# Patient Record
Sex: MALE | Race: WHITE | Employment: FULL TIME | URBAN - METROPOLITAN AREA
[De-identification: names, ages, dates, MRNs, and addresses within clinical notes are randomized per-mention and may not be internally consistent; named-entity substitution may affect disease eponyms.]

---

## 2018-08-01 ENCOUNTER — HOSPITAL ENCOUNTER (EMERGENCY)
Age: 28
Discharge: HOME | End: 2018-08-01
Attending: EMERGENCY MEDICINE
Payer: COMMERCIAL

## 2018-08-01 ENCOUNTER — APPOINTMENT (OUTPATIENT)
Dept: CT IMAGING | Age: 28
End: 2018-08-01
Payer: COMMERCIAL

## 2018-08-01 VITALS
RESPIRATION RATE: 16 BRPM | TEMPERATURE: 98.3 F | OXYGEN SATURATION: 97 % | SYSTOLIC BLOOD PRESSURE: 140 MMHG | DIASTOLIC BLOOD PRESSURE: 87 MMHG | HEIGHT: 69 IN | BODY MASS INDEX: 46.65 KG/M2 | HEART RATE: 77 BPM | WEIGHT: 315 LBS

## 2018-08-01 DIAGNOSIS — R51.9 NONINTRACTABLE HEADACHE, UNSPECIFIED CHRONICITY PATTERN, UNSPECIFIED HEADACHE TYPE: Primary | ICD-10-CM

## 2018-08-01 LAB
CHP ED QC CHECK: NORMAL
CHP ED QC CHECK: NORMAL
GLUCOSE BLD-MCNC: 101 MG/DL
METER GLUCOSE: 101 MG/DL (ref 70–110)
PREGNANCY TEST URINE, POC: NEGATIVE

## 2018-08-01 PROCEDURE — 82962 GLUCOSE BLOOD TEST: CPT

## 2018-08-01 PROCEDURE — 70450 CT HEAD/BRAIN W/O DYE: CPT

## 2018-08-01 PROCEDURE — 99284 EMERGENCY DEPT VISIT MOD MDM: CPT

## 2018-08-01 ASSESSMENT — PAIN DESCRIPTION - DESCRIPTORS: DESCRIPTORS: PRESSURE

## 2018-08-01 ASSESSMENT — ENCOUNTER SYMPTOMS
BACK PAIN: 0
VOMITING: 0
EYE PAIN: 0
SINUS PRESSURE: 0
ABDOMINAL DISTENTION: 0
WHEEZING: 0
SHORTNESS OF BREATH: 0
SORE THROAT: 0
DIARRHEA: 0
EYE REDNESS: 0
COUGH: 0
EYE DISCHARGE: 0
NAUSEA: 0

## 2018-08-01 ASSESSMENT — PAIN DESCRIPTION - PAIN TYPE: TYPE: ACUTE PAIN

## 2018-08-01 ASSESSMENT — PAIN DESCRIPTION - LOCATION: LOCATION: HEAD;EYE

## 2018-08-01 ASSESSMENT — VISUAL ACUITY
OS: 20/25
OD: 20/25

## 2018-08-01 ASSESSMENT — PAIN DESCRIPTION - FREQUENCY: FREQUENCY: CONTINUOUS

## 2018-08-01 ASSESSMENT — PAIN SCALES - GENERAL: PAINLEVEL_OUTOF10: 6

## 2018-08-01 NOTE — ED PROVIDER NOTES
------------------------- NURSING NOTES AND VITALS REVIEWED ---------------------------  Date / Time Roomed:  8/1/2018  5:24 PM  ED Bed Assignment:  29/29    The nursing notes within the ED encounter and vital signs as below have been reviewed. BP (!) 157/82   Pulse 90   Temp 98.3 °F (36.8 °C)   Resp 16   Ht 5' 9\" (1.753 m)   Wt (!) 330 lb (149.7 kg)   SpO2 96%   BMI 48.73 kg/m²   Oxygen Saturation Interpretation: Normal      ------------------------------------------ PROGRESS NOTES ------------------------------------------  8:21 PM  I have spoken with the patient and discussed todays results, in addition to providing specific details for the plan of care and counseling regarding the diagnosis and prognosis. Their questions are answered at this time and they are agreeable with the plan. I discussed at length with them reasons for immediate return here for re evaluation. They will followup with their primary care physician by calling their office tomorrow. --------------------------------- ADDITIONAL PROVIDER NOTES ---------------------------------  At this time the patient is without objective evidence of an acute process requiring hospitalization or inpatient management. They have remained hemodynamically stable throughout their entire ED visit and are stable for discharge with outpatient follow-up. The plan has been discussed in detail and they are aware of the specific conditions for emergent return, as well as the importance of follow-up. New Prescriptions    No medications on file       Diagnosis:  1. Nonintractable headache, unspecified chronicity pattern, unspecified headache type        Disposition:  Patient's disposition: Discharge to home  Patient's condition is stable.        Vi Lopez,   Resident  08/01/18 [address],   Resident  08/01/18 [id number]

## 2018-08-02 NOTE — ED NOTES
Discharge instructions given, medications and follow up instructions reviewed. Patient verbalized understanding, no other noted or stated problems at this time. Patient will follow up with physicians as directed.         Nahomy Mckeon RN  08/01/18 [id number]

## 2018-08-03 ENCOUNTER — HOSPITAL ENCOUNTER (OUTPATIENT)
Age: 28
Discharge: HOME | End: 2018-08-03
Payer: COMMERCIAL

## 2018-08-03 ENCOUNTER — OFFICE VISIT (OUTPATIENT)
Dept: NEUROLOGY | Age: 28
End: 2018-08-03
Payer: COMMERCIAL

## 2018-08-03 VITALS
RESPIRATION RATE: 16 BRPM | BODY MASS INDEX: 46.65 KG/M2 | HEIGHT: 69 IN | SYSTOLIC BLOOD PRESSURE: 132 MMHG | HEART RATE: 84 BPM | TEMPERATURE: 98.3 F | DIASTOLIC BLOOD PRESSURE: 87 MMHG | WEIGHT: 315 LBS

## 2018-08-03 DIAGNOSIS — G89.29 CHRONIC NONINTRACTABLE HEADACHE, UNSPECIFIED HEADACHE TYPE: Primary | ICD-10-CM

## 2018-08-03 DIAGNOSIS — H93.A3 PULSATILE TINNITUS OF BOTH EARS: ICD-10-CM

## 2018-08-03 DIAGNOSIS — R51.9 CHRONIC NONINTRACTABLE HEADACHE, UNSPECIFIED HEADACHE TYPE: ICD-10-CM

## 2018-08-03 DIAGNOSIS — R51.9 CHRONIC NONINTRACTABLE HEADACHE, UNSPECIFIED HEADACHE TYPE: Primary | ICD-10-CM

## 2018-08-03 DIAGNOSIS — G89.29 CHRONIC NONINTRACTABLE HEADACHE, UNSPECIFIED HEADACHE TYPE: ICD-10-CM

## 2018-08-03 LAB
ALBUMIN SERPL-MCNC: 4.3 G/DL (ref 3.5–5.2)
ALP BLD-CCNC: 105 U/L (ref 35–104)
ALT SERPL-CCNC: 39 U/L (ref 0–32)
ANION GAP SERPL CALCULATED.3IONS-SCNC: 14 MMOL/L (ref 7–16)
APTT: 33.5 SEC (ref 24.5–35.1)
AST SERPL-CCNC: 45 U/L (ref 0–31)
BILIRUB SERPL-MCNC: 0.4 MG/DL (ref 0–1.2)
BUN BLDV-MCNC: 15 MG/DL (ref 6–20)
C-REACTIVE PROTEIN: 1.8 MG/DL (ref 0–0.4)
CALCIUM SERPL-MCNC: 9.5 MG/DL (ref 8.6–10.2)
CHLORIDE BLD-SCNC: 100 MMOL/L (ref 98–107)
CO2: 27 MMOL/L (ref 22–29)
CREAT SERPL-MCNC: 0.8 MG/DL (ref 0.5–1)
GFR AFRICAN AMERICAN: >60
GFR NON-AFRICAN AMERICAN: >60 ML/MIN/1.73
GLUCOSE BLD-MCNC: 89 MG/DL (ref 74–109)
HCT VFR BLD CALC: 47.4 % (ref 34–48)
HEMOGLOBIN: 15.9 G/DL (ref 11.5–15.5)
INR BLD: 1.1
MCH RBC QN AUTO: 28.3 PG (ref 26–35)
MCHC RBC AUTO-ENTMCNC: 33.5 % (ref 32–34.5)
MCV RBC AUTO: 84.5 FL (ref 80–99.9)
PDW BLD-RTO: 12.2 FL (ref 11.5–15)
PLATELET # BLD: 272 E9/L (ref 130–450)
PMV BLD AUTO: 11.6 FL (ref 7–12)
POTASSIUM SERPL-SCNC: 4.3 MMOL/L (ref 3.5–5)
PROTHROMBIN TIME: 12.4 SEC (ref 9.3–12.4)
RBC # BLD: 5.61 E12/L (ref 3.5–5.5)
SEDIMENTATION RATE, ERYTHROCYTE: 11 MM/HR (ref 0–20)
SODIUM BLD-SCNC: 141 MMOL/L (ref 132–146)
TOTAL PROTEIN: 7.7 G/DL (ref 6.4–8.3)
TSH SERPL DL<=0.05 MIU/L-ACNC: 2.49 UIU/ML (ref 0.27–4.2)
WBC # BLD: 10.2 E9/L (ref 4.5–11.5)

## 2018-08-03 PROCEDURE — 80053 COMPREHEN METABOLIC PANEL: CPT

## 2018-08-03 PROCEDURE — 86140 C-REACTIVE PROTEIN: CPT

## 2018-08-03 PROCEDURE — 84443 ASSAY THYROID STIM HORMONE: CPT

## 2018-08-03 PROCEDURE — 85730 THROMBOPLASTIN TIME PARTIAL: CPT

## 2018-08-03 PROCEDURE — 85610 PROTHROMBIN TIME: CPT

## 2018-08-03 PROCEDURE — 99244 OFF/OP CNSLTJ NEW/EST MOD 40: CPT | Performed by: NURSE PRACTITIONER

## 2018-08-03 PROCEDURE — 36415 COLL VENOUS BLD VENIPUNCTURE: CPT

## 2018-08-03 PROCEDURE — 85027 COMPLETE CBC AUTOMATED: CPT

## 2018-08-03 PROCEDURE — 85651 RBC SED RATE NONAUTOMATED: CPT

## 2018-08-03 RX ORDER — M-VIT,TX,IRON,MINS/CALC/FOLIC 27MG-0.4MG
1 TABLET ORAL DAILY
COMMUNITY

## 2018-08-03 RX ORDER — VALACYCLOVIR HYDROCHLORIDE 1 G/1
500 TABLET, FILM COATED ORAL DAILY
COMMUNITY
End: 2021-01-13

## 2018-08-03 RX ORDER — BUPROPION HYDROCHLORIDE 150 MG/1
150 TABLET ORAL EVERY MORNING
COMMUNITY

## 2018-08-03 RX ORDER — TESTOSTERONE 12.5 MG/1.25G
1 GEL TOPICAL DAILY
COMMUNITY
End: 2019-06-17

## 2018-08-03 NOTE — PATIENT INSTRUCTIONS
began, how long it lasted, and what the pain was like (throbbing, aching, stabbing, or dull). Write down any other symptoms you had with the headache, such as nausea, flashing lights or dark spots, or sensitivity to bright light or loud noise. Note if the headache occurred near your period. List anything that might have triggered the headache, such as certain foods (chocolate, cheese, wine) or odors, smoke, bright light, stress, or lack of sleep. · Find healthy ways to deal with stress. Headaches are most common during or right after stressful times. Take time to relax before and after you do something that has caused a headache in the past.  · Try to keep your muscles relaxed by keeping good posture. Check your jaw, face, neck, and shoulder muscles for tension, and try relaxing them. When sitting at a desk, change positions often, and stretch for 30 seconds each hour. · Get plenty of sleep and exercise. · Eat regularly and well. Long periods without food can trigger a headache. · Treat yourself to a massage. Some people find that regular massages are very helpful in relieving tension. · Limit caffeine by not drinking too much coffee, tea, or soda. But don't quit caffeine suddenly, because that can also give you headaches. · Reduce eyestrain from computers by blinking frequently and looking away from the computer screen every so often. Make sure you have proper eyewear and that your monitor is set up properly, about an arm's length away. · Seek help if you have depression or anxiety. Your headaches may be linked to these conditions. Treatment can both prevent headaches and help with symptoms of anxiety or depression. When should you call for help? Call 911 anytime you think you may need emergency care. For example, call if:    · You have signs of a stroke. These may include:  ¨ Sudden numbness, paralysis, or weakness in your face, arm, or leg, especially on only one side of your body.   ¨ Sudden vision

## 2018-08-03 NOTE — PROGRESS NOTES
currently undergoing gender reassignment and has been on testosterone for 3 years    She is a good historian with a PMH of migraines and fibromyalgia    Description of Headaches:  Location of pain: bilateral, retro-orbital  Radiation of pain?:nuchal  Character of pain:pressure and pulsating  Severity of pain: 10  Accompanying symptoms: nausea, vomiting, sonophobia, photophobia, scotomata, diplopia, pulsatile tinnitus, neck stiffness  Prodromal sx?: scotomata---visual \"squiggly lines  Rapidity of onset: gradual  Typical duration of individual headache: several days to an entire week  Are most headaches similar in presentation? yes  Typical precipitants: stress, working in front of CRT screen    Temporal Pattern of Headaches:  Started having HA's 3 years ago  Worst time of day: varies  Awaken from sleep?: yes -   Seasonal pattern?: no  Clustering of HA's over time? no  Overall pattern since problem began: unchanged    Degree of Functional Impairment: moderate    Current Use of Meds to Treat HA:  Abortive meds? acetaminophen, NSAIDs (ibuprofen), aspirin/acetaminophen/caffeine, sumatriptan PO  Daily use? no  Prophylactic meds? Topamax failed    Additional Relevant History:  History of head/neck trauma? no  History of head/neck surgery? no  Family h/o headache problems? yes - mother had migraines  Use of meds that might worsen HA's (nitrates, exogenous estrogens,    Nifedipine)? yes - testosterone  Exposure to carbon monoxide? no  Substance use: illicit drugs: marijuana    Her headaches began prior to starting her testosterone therapy three years ago. She reports at least 15 HA days per month refractory to OTC analgesics---- and failure of Topamax and Imitrex in the past.    She is currently on no daily preventative or rescue migraine medication    She also reports constant pulsatile tinnitus---and diplopia with her HAs.     On Wed, she saw optometry at [facility] who felt she may have papilledema, however follow up visual fields Full    II: pupils DONOVAN   III,VII: ptosis None   III,IV,VI: extraocular muscles  EOMI without nystagmus   V: mastication Normal   V: facial light touch sensation  Normal to LT and PP   V,VII: corneal reflex     VII: facial muscle function - upper  Normal   VII: facial muscle function - lower Normal   VIII: hearing Normal   IX: soft palate elevation  Normal   IX,X: gag reflex    XI: trapezius strength  5/5   XI: sternocleidomastoid strength 5/5   XI: neck extension strength  5/5   XII: tongue strength  Normal     Motor:  5/5 throughout  Obese bulk and normal tone  No drift   No abnormal movements    Sensory:  LT and PP normal  Vibration normal    Coordination:   FN, FFM and MAGDALENO normal  HS normal    Gait:  Lumbering but normal  Romberg's negative  Walks well on toes, heels, and tandem    DTR:   Right Brachioradialis reflex 1+  Left Brachioradialis reflex 1+  Right Biceps reflex 1+  Left Biceps reflex 1+  Right Triceps reflex 0  Left Triceps reflex 0  Right Quadriceps reflex 1+  Left Quadriceps reflex 1+  Right Achilles reflex 2+  Left Achilles reflex 2+    No Babinskis  No Scales's    No other pathological reflexes    Laboratory/Radiology:  ry/Radiology:      Ref. Range 8/1/2018 19:26   Glucose Latest Units: mg/dL 101     Optometry and ophthalmology notes reviewed    CT head: no acute abnormality    All labs and images were personally reviewed at the time of this visit    Assessment:     The patient presents with three years of persistent migraine HAs with aura---- with pulsatile tinnitus, diplopia, and progressive, gradual decrease in visual acuity---concerning for possible idiopathic intracranial HTN (IIH)    Her neuro exam is unremarkable and ophthalmology eval proved no papilledema.     According to the literature, patients undergoing testosterone therapy for gender reassignment may have an increased risk of IIH---with or without the presence of papilledema, therefore I feel additional workup with MRI of the brain WWO and LP for opening pressure is prudent. I will also obtain routine labs as well as inflammatory markers    As her decrease in visual acuity has occurred gradually over the past few years I do not feel it is urgent to start her on Diamox prior to this workup. However, if the diagnosis is confirmed, she will need to follow regularly with ophthalmology. Should LP prove elevated opening pressure and MRI normal, MRV will be done to rule out cerebral venous thrombosis. Should workup prove negative for IIH she requires initiation of daily preventative migraine medication and rescue therapy as well. Medically, she is morbidly obese and must lose weight, begin exercising, and eat healthy. Plan:     IR for LP with opening pressure    MRI brain WWO    Sed, CRP, CBC, CMP, PT/INR    She will call immediately with any worsening vision    RTO in 1 month or sooner JUNIOR Barbosa  8:36 AM  8/3/2018    I spent 50 minutes with this patient obtaining the HPI and discussing the exam with greater than 50% of the time providing counseling and education on medications and other treatment plans. All questions were answered prior to leaving my office.

## 2018-08-09 ENCOUNTER — TELEPHONE (OUTPATIENT)
Dept: NEUROLOGY | Age: 28
End: 2018-08-09

## 2018-08-14 ENCOUNTER — HOSPITAL ENCOUNTER (OUTPATIENT)
Dept: MRI IMAGING | Age: 28
Discharge: HOME | End: 2018-08-16
Payer: COMMERCIAL

## 2018-08-14 DIAGNOSIS — G89.29 CHRONIC NONINTRACTABLE HEADACHE, UNSPECIFIED HEADACHE TYPE: ICD-10-CM

## 2018-08-14 DIAGNOSIS — H93.A3 PULSATILE TINNITUS OF BOTH EARS: ICD-10-CM

## 2018-08-14 DIAGNOSIS — R51.9 CHRONIC NONINTRACTABLE HEADACHE, UNSPECIFIED HEADACHE TYPE: ICD-10-CM

## 2018-08-14 PROCEDURE — A9577 INJ MULTIHANCE: HCPCS | Performed by: RADIOLOGY

## 2018-08-14 PROCEDURE — 70553 MRI BRAIN STEM W/O & W/DYE: CPT

## 2018-08-14 PROCEDURE — 6360000004 HC RX CONTRAST MEDICATION: Performed by: RADIOLOGY

## 2018-08-14 RX ADMIN — GADOBENATE DIMEGLUMINE 10 ML: 529 INJECTION, SOLUTION INTRAVENOUS at 10:55

## 2018-08-15 ENCOUNTER — TELEPHONE (OUTPATIENT)
Dept: NEUROLOGY | Age: 28
End: 2018-08-15

## 2018-08-15 DIAGNOSIS — R51.9 CHRONIC NONINTRACTABLE HEADACHE, UNSPECIFIED HEADACHE TYPE: Primary | ICD-10-CM

## 2018-08-15 DIAGNOSIS — G89.29 CHRONIC NONINTRACTABLE HEADACHE, UNSPECIFIED HEADACHE TYPE: Primary | ICD-10-CM

## 2018-08-23 ENCOUNTER — HOSPITAL ENCOUNTER (OUTPATIENT)
Dept: GENERAL RADIOLOGY | Age: 28
Discharge: HOME | End: 2018-08-25
Payer: COMMERCIAL

## 2018-08-23 VITALS
HEART RATE: 62 BPM | DIASTOLIC BLOOD PRESSURE: 79 MMHG | OXYGEN SATURATION: 98 % | BODY MASS INDEX: 46.65 KG/M2 | SYSTOLIC BLOOD PRESSURE: 128 MMHG | HEIGHT: 69 IN | RESPIRATION RATE: 16 BRPM | WEIGHT: 315 LBS | TEMPERATURE: 97.2 F

## 2018-08-23 DIAGNOSIS — R51.9 CHRONIC NONINTRACTABLE HEADACHE, UNSPECIFIED HEADACHE TYPE: ICD-10-CM

## 2018-08-23 DIAGNOSIS — H93.A3 PULSATILE TINNITUS OF BOTH EARS: ICD-10-CM

## 2018-08-23 DIAGNOSIS — G89.29 CHRONIC NONINTRACTABLE HEADACHE, UNSPECIFIED HEADACHE TYPE: ICD-10-CM

## 2018-08-23 LAB
APPEARANCE CSF: CLEAR
APPEARANCE CSF: CLEAR
COLOR CSF: COLORLESS
COLOR CSF: COLORLESS
GLUCOSE, CSF: 61 MG/DL (ref 40–70)
GRAM STAIN ORDERABLE: NORMAL
MONOCYTE, CSF: 50 % (ref 10–70)
MONOCYTE, CSF: 50 % (ref 10–70)
NEUTROPHILS, CSF: 50 % (ref 0–10)
NEUTROPHILS, CSF: 50 % (ref 0–10)
PROTEIN CSF: 23 MG/DL (ref 15–40)
RBC CSF: <2000 /UL
RBC CSF: <2000 /UL
TUBE NUMBER CSF: ABNORMAL
TUBE NUMBER CSF: ABNORMAL
WBC CSF: <3 /UL (ref 0–2)
WBC CSF: <3 /UL (ref 0–2)

## 2018-08-23 PROCEDURE — 86592 SYPHILIS TEST NON-TREP QUAL: CPT

## 2018-08-23 PROCEDURE — 89051 BODY FLUID CELL COUNT: CPT

## 2018-08-23 PROCEDURE — 77003 FLUOROGUIDE FOR SPINE INJECT: CPT

## 2018-08-23 PROCEDURE — 7100000010 HC PHASE II RECOVERY - FIRST 15 MIN

## 2018-08-23 PROCEDURE — 7100000011 HC PHASE II RECOVERY - ADDTL 15 MIN

## 2018-08-23 PROCEDURE — 87070 CULTURE OTHR SPECIMN AEROBIC: CPT

## 2018-08-23 PROCEDURE — 84157 ASSAY OF PROTEIN OTHER: CPT

## 2018-08-23 PROCEDURE — 88108 CYTOPATH CONCENTRATE TECH: CPT

## 2018-08-23 PROCEDURE — 87205 SMEAR GRAM STAIN: CPT

## 2018-08-23 PROCEDURE — 2500000003 HC RX 250 WO HCPCS: Performed by: RADIOLOGY

## 2018-08-23 PROCEDURE — 82945 GLUCOSE OTHER FLUID: CPT

## 2018-08-23 PROCEDURE — 62270 DX LMBR SPI PNXR: CPT

## 2018-08-23 RX ORDER — LIDOCAINE HYDROCHLORIDE 10 MG/ML
10 INJECTION, SOLUTION INFILTRATION; PERINEURAL ONCE
Status: COMPLETED | OUTPATIENT
Start: 2018-08-23 | End: 2018-08-23

## 2018-08-23 RX ADMIN — LIDOCAINE HYDROCHLORIDE 10 ML: 10 INJECTION, SOLUTION INFILTRATION; PERINEURAL at 08:58

## 2018-08-23 ASSESSMENT — PAIN DESCRIPTION - DESCRIPTORS: DESCRIPTORS: ACHING;DULL;HEADACHE

## 2018-08-23 ASSESSMENT — PAIN SCALES - GENERAL
PAINLEVEL_OUTOF10: 2
PAINLEVEL_OUTOF10: 0

## 2018-08-23 ASSESSMENT — PAIN - FUNCTIONAL ASSESSMENT: PAIN_FUNCTIONAL_ASSESSMENT: 0-10

## 2018-08-23 NOTE — FLOWSHEET NOTE
IRFLOWSHEET    Date: 8/23/2018    Time: 8:00 AM     Exam: Image guided lumbar puncture    Radiologist Performing Procedure: Dr. Suzan Sanchez    Nurse Reporting/Phone: [id number]     Nurse Receiving: [address]    Permit signed:      Yes    ID Band Checklist: Yes    Allergies: Sulfa antibiotics     TIME OUT: 8:57    PROCEDURE START TIME: 751    Puncture Site: lower middle back    Puncture Time: 901    Catheters: 20 gauge spinal needle    LABS:   Lab Results   Component Value Date    INR 1.1 08/03/2018    PROTIME 12.4 08/03/2018           Lab Results   Component Value Date    CREATININE 0.8 08/03/2018    BUN 15 08/03/2018          Lab Results   Component Value Date    HGB 15.9 (H) 08/03/2018    HCT 47.4 08/03/2018     08/03/2018         PROCEDURE END TIME: 0913    Total Contrast: NA    Fluoroscopy Time: 9.5JVT    Complications:  None    Comments: Patient arrival from home to radiology for lumbar puncture. Vitals taken, consent signed, and Dr. Suzan Sanchze in to speak with the patient about the procedure. Patient placed prone on Fluoroscopy table, patient prepped and draped. Using fluoroscopy imaging, needle placed to lower back by Dr. Suzan Sanchez @ [phone]. Opening pressure of [id number] obtained. Spinal fluid removed and sent to lab. Needle removed, site cleansed, and band aid applied to site. Patient placed on cart and taken to Stage II, nurse handoff report called.

## 2018-08-24 ENCOUNTER — TELEPHONE (OUTPATIENT)
Dept: NEUROLOGY | Age: 28
End: 2018-08-24

## 2018-08-27 LAB
CSF CULTURE: NORMAL
GRAM STAIN RESULT: NORMAL
VDRL CSF SCREEN: NON REACTIVE

## 2018-08-30 ENCOUNTER — OFFICE VISIT (OUTPATIENT)
Dept: NEUROLOGY | Age: 28
End: 2018-08-30
Payer: COMMERCIAL

## 2018-08-30 VITALS
DIASTOLIC BLOOD PRESSURE: 87 MMHG | HEIGHT: 69 IN | WEIGHT: 315 LBS | RESPIRATION RATE: 20 BRPM | SYSTOLIC BLOOD PRESSURE: 124 MMHG | HEART RATE: 89 BPM | OXYGEN SATURATION: 97 % | BODY MASS INDEX: 46.65 KG/M2

## 2018-08-30 DIAGNOSIS — G43.109 MIGRAINE WITH AURA AND WITHOUT STATUS MIGRAINOSUS, NOT INTRACTABLE: ICD-10-CM

## 2018-08-30 PROCEDURE — 99213 OFFICE O/P EST LOW 20 MIN: CPT | Performed by: NURSE PRACTITIONER

## 2018-08-30 RX ORDER — RIZATRIPTAN BENZOATE 10 MG/1
10 TABLET ORAL
Qty: 15 TABLET | Refills: 2 | Status: SHIPPED | OUTPATIENT
Start: 2018-08-30 | End: 2018-09-27 | Stop reason: SDUPTHER

## 2018-08-30 NOTE — PROGRESS NOTES
[facility]. Angelina Madsen M.D., F.A.C.P. Noe Sherman, DNP, APRN, ACNS-BC  Sara Jump. Pebbles Blankenship, MSN, APRN-FNP-C  [address], [address] [address]  Phone: [phone]  Fax: [phone]       Amilcar Wells is a 34 y.o. right handed individual currently undergoing gender reassignment   Born female and transitioning to male    Prefers to be called [email or web address] and referred to as him. We are following him for migraines    Workup for idiopathic intracranial HTN negative   Opening pressure normal    No papilledema    He continues to experience over 15 migraine HA days per month    Description of Headaches:  Location of pain: bilateral, retro-orbital  Radiation of pain?:nuchal  Character of pain:pressure and pulsating  Severity of pain: 10  Accompanying symptoms: nausea, vomiting, sonophobia, photophobia, scotomata, diplopia, rushing water sounds in ears, neck stiffness  Prodromal sx?: scotomata---visual \"squiggly lines  Rapidity of onset: gradual  Typical duration of individual headache: several days to an entire week  Are most headaches similar in presentation? yes  Typical precipitants: stress, working in front of CRT screen    Temporal Pattern of Headaches:  Started having HA's 3 years ago  Worst time of day: varies  Awaken from sleep?: yes -   Seasonal pattern?: no  Clustering of HA's over time? no  Overall pattern since problem began: unchanged    Degree of Functional Impairment: moderate    Current Use of Meds to Treat HA:  Abortive meds? None currently  Daily use? no  Prophylactic meds? None currently    He failed Topamax for HA prevention in the past and now tells me he was also on amitriptyline for 2 years, which did not help either. Failed Imitrex for rescue    No worsening of vision since the last visit---though he has days of increased light sensitivity.     No other new neuro issues to report    Still sleeping poorly, has last 5 lbs since last visit, 15.9 (H) 08/03/2018    HCT 47.4 08/03/2018    MCV 84.5 08/03/2018     08/03/2018     Lab Results   Component Value Date    ALT 39 (H) 08/03/2018    AST 45 (H) 08/03/2018    ALKPHOS 105 (H) 08/03/2018    BILITOT 0.4 08/03/2018      Ref. Range 8/3/2018 10:11   TSH Latest Ref Range: 0.270 - 4.200 uIU/mL 2.490        Ref. Range 8/23/2018 09:06 8/23/2018 09:06   Appearance, CSF Latest Ref Range: Clear  Clear Clear   CSF Culture Unknown Growth not present    Glucose, CSF Latest Ref Range: 40 - 70 mg/dL 61    Protein, CSF Latest Ref Range: 15 - 40 mg/dL 23    VDRL, CSF Screen Latest Ref Range: Non Reactive  Non Reactive    RBC, CSF Latest Units: /uL <2000 <2000   WBC, CSF Latest Ref Range: 0 - 2 /uL <3 <3   Neutrophils, CSF Latest Ref Range: 0 - 10 % 50 (H) 50 (H)   Monocytes, CSF Latest Ref Range: 10 - 70 % 50 50   Color, CSF Unknown Colorless Colorless   Tube Number + CELL CT + DIFF-CSF Unknown Tube 3 Tube 1      Ref. 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 All labs and images were personally reviewed at the time of this visit    Assessment:     The patient suffers from chronic migraine HAs with aura---- suffering over 15 HA days per month   Workup for idiopathic intracranial HTN negative    He previously failed Topamax and amitriptyline for HA prevention and failed Imitrex for rescue    I feel he would be an excellent candidate for a CGRP inhibitor and will try Aimovig.  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 Additionally, the discontinuity of his semicircular canals are non-contributory---and he has been evaluated by ENT, with CT temporal bones, in the past    Medically, he should continue to lose weight, exercise, and eat healthy.     Plan:     Aimovig 70 mg SQ monthly    Maxalt PRN    Lifestyle mod as above    RTO in 4 months or sooner PRN    MESHA Morales, FNP-C  2:51 PM  8/30/2018

## 2018-08-30 NOTE — PATIENT INSTRUCTIONS
tablet through the foil or you may damage the tablet. · Use dry hands to remove the tablet and place it in your mouth. · Do not swallow the tablet whole. Allow it to dissolve in your mouth without chewing. If desired, you may drink liquid to help swallow the dissolved tablet. After taking a tablet (for adults): If your headache does not completely go away, or goes away and comes back, take a second tablet 2 hours after the first. Do not take more than 30 mg of rizatriptan in 24 hours. If your symptoms have not improved, contact your doctor before taking any more tablets. After taking a tablet (for children ages 10 to 16): If your headache does not completely go away, or goes away and comes back, contact your doctor before taking any more tablets. Call your doctor if your headache does not go away at all after taking the first rizatriptan tablet. Never use more than your recommended dose. Overuse of migraine headache medicine can make headaches worse. Contact your doctor if you have more than four headaches in one month (30 days). Do not take migraine headache medication for longer than 10 days per month. Tell your doctor if the medicine seems to stop working as well in treating your migraine attacks. Rizatriptan can raise blood pressure to dangerous levels. Your blood pressure may need to be checked often while you are using this medicine. If you use rizatriptan long-term, your heart function may need to be checked using an electrocardiograph or ECG (sometimes called an EKG). Store at room temperature away from moisture and heat. What happens if I miss a dose? Since rizatriptan is used as needed, it does not have a daily dosing schedule. Call your doctor promptly if your symptoms do not improve after using rizatriptan. What happens if I overdose? Seek emergency medical attention or call the Poison Help line at 1-817.436.6394. What should I avoid while using rizatriptan?   Do not take rizatriptan Tell your doctor if you also use:  · medicine to treat depression;  · medicine to treat a psychiatric disorder;  · a narcotic (opioid) medication; or  · medicine to prevent nausea and vomiting. This list is not complete. Other drugs may interact with rizatriptan, including prescription and over-the-counter medicines, vitamins, and herbal products. Not all possible interactions are listed in this medication guide. Where can I get more information? Your pharmacist can provide more information about rizatriptan. Remember, keep this and all other medicines out of the reach of children, never share your medicines with others, and use this medication only for the indication prescribed. Every effort has been made to ensure that the information provided by [address] [place] [address]  is accurate, up-to-date, and complete, but no guarantee is made to that effect. Drug information contained herein may be time sensitive. [facility] information has been compiled for use by healthcare practitioners and consumers in the [place] and therefore [facility] [employer] does not warrant that uses outside of the [place] are appropriate, unless specifically indicated otherwise. [facility] drug information does not endorse drugs, diagnose patients or recommend therapy. [facility] drug information is an informational resource designed to assist licensed healthcare practitioners in caring for their patients and/or to serve consumers viewing this service as a supplement to, and not a substitute for, the expertise, skill, knowledge and judgment of healthcare practitioners. The absence of a warning for a given drug or drug combination in no way should be construed to indicate that the drug or drug combination is safe, effective or appropriate for any given patient. [facility] does not assume any responsibility for any aspect of healthcare administered with the aid of information [facility] provides.  The information contained herein is not intended to

## 2018-09-27 ENCOUNTER — TELEPHONE (OUTPATIENT)
Dept: NEUROLOGY | Age: 28
End: 2018-09-27

## 2018-09-27 DIAGNOSIS — G43.109 MIGRAINE WITH AURA AND WITHOUT STATUS MIGRAINOSUS, NOT INTRACTABLE: ICD-10-CM

## 2018-09-27 RX ORDER — RIZATRIPTAN BENZOATE 10 MG/1
10 TABLET ORAL
Qty: 15 TABLET | Refills: 2 | Status: SHIPPED | OUTPATIENT
Start: 2018-09-27 | End: 2019-06-17 | Stop reason: SDUPTHER

## 2018-12-05 DIAGNOSIS — G43.109 MIGRAINE WITH AURA AND WITHOUT STATUS MIGRAINOSUS, NOT INTRACTABLE: ICD-10-CM

## 2019-01-22 ENCOUNTER — TELEPHONE (OUTPATIENT)
Dept: NEUROLOGY | Age: 29
End: 2019-01-22

## 2019-01-25 ENCOUNTER — OFFICE VISIT (OUTPATIENT)
Dept: NEUROLOGY | Age: 29
End: 2019-01-25
Payer: COMMERCIAL

## 2019-01-25 VITALS
DIASTOLIC BLOOD PRESSURE: 83 MMHG | HEART RATE: 62 BPM | SYSTOLIC BLOOD PRESSURE: 122 MMHG | HEIGHT: 69 IN | TEMPERATURE: 98 F | BODY MASS INDEX: 46.65 KG/M2 | RESPIRATION RATE: 12 BRPM | OXYGEN SATURATION: 96 % | WEIGHT: 315 LBS

## 2019-01-25 DIAGNOSIS — G43.109 MIGRAINE WITH AURA AND WITHOUT STATUS MIGRAINOSUS, NOT INTRACTABLE: Primary | ICD-10-CM

## 2019-01-25 PROCEDURE — 99213 OFFICE O/P EST LOW 20 MIN: CPT | Performed by: NURSE PRACTITIONER

## 2019-01-25 RX ORDER — CLINDAMYCIN PHOSPHATE 10 MG/G
GEL TOPICAL PRN
COMMUNITY
Start: 2017-03-27

## 2019-01-28 ENCOUNTER — TELEPHONE (OUTPATIENT)
Dept: NEUROLOGY | Age: 29
End: 2019-01-28

## 2019-06-17 ENCOUNTER — OFFICE VISIT (OUTPATIENT)
Dept: NEUROLOGY | Age: 29
End: 2019-06-17
Payer: COMMERCIAL

## 2019-06-17 VITALS
BODY MASS INDEX: 46.65 KG/M2 | DIASTOLIC BLOOD PRESSURE: 86 MMHG | RESPIRATION RATE: 12 BRPM | WEIGHT: 315 LBS | TEMPERATURE: 98.8 F | HEART RATE: 77 BPM | SYSTOLIC BLOOD PRESSURE: 128 MMHG | OXYGEN SATURATION: 97 % | HEIGHT: 69 IN

## 2019-06-17 DIAGNOSIS — G43.109 MIGRAINE WITH AURA AND WITHOUT STATUS MIGRAINOSUS, NOT INTRACTABLE: ICD-10-CM

## 2019-06-17 PROCEDURE — 99214 OFFICE O/P EST MOD 30 MIN: CPT | Performed by: NURSE PRACTITIONER

## 2019-06-17 RX ORDER — TESTOSTERONE CYPIONATE 200 MG/ML
0.5 INJECTION, SOLUTION INTRAMUSCULAR WEEKLY
Refills: 0 | COMMUNITY
Start: 2019-03-20

## 2019-06-17 RX ORDER — RIZATRIPTAN BENZOATE 10 MG/1
10 TABLET ORAL
Qty: 15 TABLET | Refills: 2 | Status: SHIPPED | OUTPATIENT
Start: 2019-06-17 | End: 2019-12-16 | Stop reason: SDUPTHER

## 2019-06-17 SDOH — HEALTH STABILITY: MENTAL HEALTH: HOW OFTEN DO YOU HAVE A DRINK CONTAINING ALCOHOL?: MONTHLY OR LESS

## 2019-06-17 NOTE — PATIENT INSTRUCTIONS
Patient Education        Migraine Headache: Care Instructions  Your Care Instructions  Migraines are painful, throbbing headaches that often start on one side of the head. They may cause nausea and vomiting and make you sensitive to light, sound, or smell. Without treatment, migraines can last from 4 hours to a few days. Medicines can help prevent migraines or stop them after they have started. Your doctor can help you find which ones work best for you. Follow-up care is a key part of your treatment and safety. Be sure to make and go to all appointments, and call your doctor if you are having problems. It's also a good idea to know your test results and keep a list of the medicines you take. How can you care for yourself at home? · Do not drive if you have taken a prescription pain medicine. · Rest in a quiet, dark room until your headache is gone. Close your eyes, and try to relax or go to sleep. Don't watch TV or read. · Put a cold, moist cloth or cold pack on the painful area for 10 to 20 minutes at a time. Put a thin cloth between the cold pack and your skin. · Use a warm, moist towel or a heating pad set on low to relax tight shoulder and neck muscles. · Have someone gently massage your neck and shoulders. · Take your medicines exactly as prescribed. Call your doctor if you think you are having a problem with your medicine. You will get more details on the specific medicines your doctor prescribes. · Be careful not to take pain medicine more often than the instructions allow. You could get worse or more frequent headaches when the medicine wears off. To prevent migraines  · Keep a headache diary so you can figure out what triggers your headaches. Avoiding triggers may help you prevent headaches. Record when each headache began, how long it lasted, and what the pain was like.  (Was it throbbing, aching, stabbing, or dull?) Write down any other symptoms you had with the headache, such as nausea, good.  · If you are taking birth control pills or hormone therapy, talk to your doctor about whether they are triggering your migraines. When should you call for help? Call 911 anytime you think you may need emergency care. For example, call if:    · You have signs of a stroke. These may include:  ? Sudden numbness, paralysis, or weakness in your face, arm, or leg, especially on only one side of your body. ? Sudden vision changes. ? Sudden trouble speaking. ? Sudden confusion or trouble understanding simple statements. ? Sudden problems with walking or balance. ? A sudden, severe headache that is different from past headaches.    Call your doctor now or seek immediate medical care if:    · You have new or worse nausea and vomiting.     · You have a new or higher fever.     · Your headache gets much worse.    Watch closely for changes in your health, and be sure to contact your doctor if:    · You are not getting better after 2 days (48 hours). Where can you learn more? Go to https://Cooking.com.[employer]Rx. org and sign in to your ethority account. Enter I199 in the [employer] box to learn more about \"Migraine Headache: Care Instructions. \"     If you do not have an account, please click on the \"Sign Up Now\" link. Current as of: Pastora 3, 2018  Content Version: 12.0  © 2275-9320 [employer]. Care instructions adapted under license by [facility] ([facility]). If you have questions about a medical condition or this instruction, always ask your healthcare professional. [place] [address] any warranty or liability for your use of this information.

## 2019-06-17 NOTE — PROGRESS NOTES
issues and he remains on testosterone    No chest pain or palpitations  No SOB  No vertigo, lightheadedness or loss of consciousness  No falls, tripping or stumbling  No incontinence of bowels or bladder  No itching or bruising appreciated  No numbness, tingling or focal arm/leg weakness    ROS otherwise negative     Current Outpatient Medications   Medication Sig Dispense Refill    DEPO-TESTOSTERONE 200 MG/ML injection Inject 0.375 mLs into the muscle once a week.  0    VENTOLIN  (90 Base) MCG/ACT inhaler as needed  0    clindamycin (CLINDAGEL) 1 % gel as needed      Erenumab-aooe (AIMOVIG) 70 MG/ML SOAJ Inject 70 mg into the skin every 30 days 3 pen 2    rizatriptan (MAXALT) 10 MG tablet Take 1 tablet by mouth once as needed for Migraine May repeat in 2 hours if needed 15 tablet 2    buPROPion (WELLBUTRIN XL) 150 MG extended release tablet Take 150 mg by mouth every morning      valACYclovir (VALTREX) 1 g tablet Take 500 mg by mouth daily       Multiple Vitamins-Minerals (THERAPEUTIC MULTIVITAMIN-MINERALS) tablet Take 1 tablet by mouth daily       No current facility-administered medications for this visit.       Objective:     /86 (Site: Right Upper Arm, Position: Sitting, Cuff Size: Large Adult)   Pulse 77   Temp 98.8 °F (37.1 °C) (Oral)   Resp 12   Ht 5' 9\" (1.753 m)   Wt (!) 329 lb (149.2 kg)   SpO2 97%   BMI 48.58 kg/m²     General appearance: alert, appears stated age, cooperative and in no distress---morbidly obese  Head: normocephalic, without obvious abnormality, atraumatic---hirsute  Eyes: conjunctivae/corneas clear; no drainage  Neck: no carotid bruit,---full ROM  Lungs: clear to auscultation bilaterally  Heart: regular rate and rhythm, S1, S2 normal, no murmur  Extremities: normal, atraumatic, no cyanosis or edema  Pulses: 2+ and symmetric  Skin:  Red, raised rash L forearm    Mental Status: alert and oriented x 4----very pleasant and cooperative as always    Appropriate attention/concentration  Intact fundus of knowledge  Repetition intact  Memories intact    Speech: no dysarthria  Language: no aphasias    Cranial Nerves:  I: smell    II: visual acuity     II: visual fields Full    II: pupils DONOVAN   III,VII: ptosis None   III,IV,VI: extraocular muscles  EOMI without nystagmus   V: mastication Normal   V: facial light touch sensation  Normal    V,VII: corneal reflex     VII: facial muscle function - upper  Normal   VII: facial muscle function - lower Normal   VIII: hearing Normal   IX: soft palate elevation  Normal   IX,X: gag reflex    XI: trapezius strength  5/5   XI: sternocleidomastoid strength 5/5   XI: neck extension strength  5/5   XII: tongue strength  Normal     Motor:  5/5 throughout  Obese bulk and normal tone  No drift   No abnormal movements    Sensory:  LT normal    Coordination:   FN, FFM and MAGDALENO normal  HS normal    Gait:  Lumbering but normal    DTR:   Right Brachioradialis reflex 2+  Left Brachioradialis reflex 2+  Right Biceps reflex 1+  Left Biceps reflex 1+  Right Triceps reflex 0  Left Triceps reflex 0  Right Quadriceps reflex 2+  Left Quadriceps reflex 2+  Right Achilles reflex 2+  Left Achilles reflex 2+    No Scales's    No other pathological reflexes    Laboratory/Radiology:  ry/Radiology:     No current labs or images to review    Assessment:     The patient suffers from chronic migraine HAs with aura   Excellent response to Aimovig but desires HA freedom, and still having 3-4 HA days per month   Will increase Aimovig. 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  He will also keep a HA diary    Plan:     Increase Aimovig 140 mg SQ monthly    Continue Maxalt PRN    Routine labs at next visit if not already done    Lifestyle mod as above    RTO in 6

## 2019-09-18 ENCOUNTER — OFFICE VISIT (OUTPATIENT)
Dept: FAMILY MEDICINE CLINIC | Age: 29
End: 2019-09-18
Payer: COMMERCIAL

## 2019-09-18 VITALS
WEIGHT: 315 LBS | BODY MASS INDEX: 46.65 KG/M2 | OXYGEN SATURATION: 98 % | HEIGHT: 69 IN | TEMPERATURE: 97.6 F | SYSTOLIC BLOOD PRESSURE: 130 MMHG | RESPIRATION RATE: 20 BRPM | DIASTOLIC BLOOD PRESSURE: 82 MMHG | HEART RATE: 78 BPM

## 2019-09-18 DIAGNOSIS — M54.50 ACUTE BILATERAL LOW BACK PAIN WITHOUT SCIATICA: Primary | ICD-10-CM

## 2019-09-18 PROCEDURE — 99214 OFFICE O/P EST MOD 30 MIN: CPT | Performed by: PHYSICIAN ASSISTANT

## 2019-09-18 PROCEDURE — 96372 THER/PROPH/DIAG INJ SC/IM: CPT | Performed by: PHYSICIAN ASSISTANT

## 2019-09-18 RX ORDER — CYCLOBENZAPRINE HCL 10 MG
10 TABLET ORAL 3 TIMES DAILY PRN
Qty: 21 TABLET | Refills: 0 | Status: SHIPPED | OUTPATIENT
Start: 2019-09-18 | End: 2019-09-25

## 2019-09-18 RX ORDER — METHYLPREDNISOLONE ACETATE 80 MG/ML
80 INJECTION, SUSPENSION INTRA-ARTICULAR; INTRALESIONAL; INTRAMUSCULAR; SOFT TISSUE ONCE
Status: COMPLETED | OUTPATIENT
Start: 2019-09-18 | End: 2019-09-18

## 2019-09-18 RX ORDER — HALOBETASOL PROPIONATE 0.5 MG/G
AEROSOL, FOAM TOPICAL
COMMUNITY

## 2019-09-18 RX ADMIN — METHYLPREDNISOLONE ACETATE 80 MG: 80 INJECTION, SUSPENSION INTRA-ARTICULAR; INTRALESIONAL; INTRAMUSCULAR; SOFT TISSUE at 14:19

## 2019-09-18 NOTE — PROGRESS NOTES
induration. Tenderness noted to diffuse tenderness noted to the lower lumbar area without specific midline tenderness, no thoracic spinal tenderness. No step offs or crepitus noted. Straight leg raise on left is positive. Straight leg raise on right is positive. DTR 2+ at patella bilaterally and symmetrically. No CVA tenderness noted bilaterally. The patient was able to stand on toes, heels, and rise from a squatting position. Pain was increased with bending and twisting at the waist.  Musculoskeletal: No deformity noted to bilateral lower extremities. no cyanosis or mottling noted. normal pulses at DP and PT 2+ bilaterally and symmetrically. Normal 5/5 strength at ankles with dorsiflexion and plantar flexion. Patient is able to ambulate. Normal sensation noted to the bilateral lower extremities. Neurological: alert and oriented x4, normal sensory and motor observed. DTR 2+ at patellar bilaterally. Psychiatric: Cooperative      Testing:           Medical Decision Making:     The patient on arrival does not appear to be in any apparent distress or discomfort. Vital signs reviewed and noted to be within normal limits. The patient was noted to be grossly neurologically intact. The patient did have pain with movement as well as standing on toes and heels. The patient states that she really just needs intramuscular injection of steroids and some muscle relaxants. The patient was offered other medications for home but declined. We gave the patient 80 mg of methylprednisone here. Patient will be given a refill on her Flexeril. The patient will follow-up with PCP. Return if any of the signs or symptoms worsen. Patient has no other questions or concerns at this time. Clinical Impression:   Fortino Alpers was seen today for back pain.     Diagnoses and all orders for this visit:    Acute bilateral low back pain without sciatica    Other orders  -     methylPREDNISolone acetate (DEPO-MEDROL) injection 80 mg  -     cyclobenzaprine (FLEXERIL) 10 MG tablet; Take 1 tablet by mouth 3 times daily as needed for Muscle spasms        The patient is to call for any concerns or return if any of the signs or symptoms worsen. The patient is to follow-up with PCP in the next 2-3 days for repeat evaluation repeat assessment or go directly to the emergency department.      SIGNATURE: Paola Gerber III, PA-C

## 2019-10-01 ENCOUNTER — OFFICE VISIT (OUTPATIENT)
Dept: FAMILY MEDICINE CLINIC | Age: 29
End: 2019-10-01
Payer: COMMERCIAL

## 2019-10-01 VITALS
WEIGHT: 314.4 LBS | BODY MASS INDEX: 46.57 KG/M2 | SYSTOLIC BLOOD PRESSURE: 112 MMHG | RESPIRATION RATE: 18 BRPM | HEIGHT: 69 IN | OXYGEN SATURATION: 98 % | HEART RATE: 96 BPM | DIASTOLIC BLOOD PRESSURE: 86 MMHG

## 2019-10-01 DIAGNOSIS — E66.01 CLASS 3 SEVERE OBESITY DUE TO EXCESS CALORIES WITH BODY MASS INDEX (BMI) OF 45.0 TO 49.9 IN ADULT, UNSPECIFIED WHETHER SERIOUS COMORBIDITY PRESENT (HCC): ICD-10-CM

## 2019-10-01 DIAGNOSIS — M62.830 BACK MUSCLE SPASM: ICD-10-CM

## 2019-10-01 DIAGNOSIS — R74.8 ELEVATED LIVER ENZYMES: ICD-10-CM

## 2019-10-01 DIAGNOSIS — K21.9 GASTROESOPHAGEAL REFLUX DISEASE, ESOPHAGITIS PRESENCE NOT SPECIFIED: ICD-10-CM

## 2019-10-01 DIAGNOSIS — F33.42 RECURRENT MAJOR DEPRESSIVE DISORDER, IN FULL REMISSION (HCC): Primary | ICD-10-CM

## 2019-10-01 DIAGNOSIS — D58.2 ELEVATED HEMOGLOBIN (HCC): ICD-10-CM

## 2019-10-01 PROBLEM — B00.9 RECURRENT TYPE 1 HERPES SIMPLEX OF OTHER SITE: Status: ACTIVE | Noted: 2018-02-15

## 2019-10-01 PROBLEM — K76.0 FATTY (CHANGE OF) LIVER, NOT ELSEWHERE CLASSIFIED: Status: ACTIVE | Noted: 2017-09-06

## 2019-10-01 PROCEDURE — G8431 POS CLIN DEPRES SCRN F/U DOC: HCPCS | Performed by: FAMILY MEDICINE

## 2019-10-01 PROCEDURE — 99204 OFFICE O/P NEW MOD 45 MIN: CPT | Performed by: FAMILY MEDICINE

## 2019-10-01 RX ORDER — CYCLOBENZAPRINE HCL 10 MG
10 TABLET ORAL NIGHTLY PRN
Qty: 30 TABLET | Refills: 0 | Status: SHIPPED | OUTPATIENT
Start: 2019-10-01 | End: 2019-12-02 | Stop reason: SDUPTHER

## 2019-10-01 RX ORDER — PANTOPRAZOLE SODIUM 20 MG/1
20 TABLET, DELAYED RELEASE ORAL
Qty: 30 TABLET | Refills: 1 | Status: SHIPPED | OUTPATIENT
Start: 2019-10-01 | End: 2019-12-02 | Stop reason: ALTCHOICE

## 2019-10-01 SDOH — HEALTH STABILITY: MENTAL HEALTH
STRESS IS WHEN SOMEONE FEELS TENSE, NERVOUS, ANXIOUS, OR CAN'T SLEEP AT NIGHT BECAUSE THEIR MIND IS TROUBLED. HOW STRESSED ARE YOU?: RATHER MUCH

## 2019-10-01 SDOH — HEALTH STABILITY: PHYSICAL HEALTH: ON AVERAGE, HOW MANY MINUTES DO YOU ENGAGE IN EXERCISE AT THIS LEVEL?: 50 MIN

## 2019-10-01 SDOH — HEALTH STABILITY: PHYSICAL HEALTH: ON AVERAGE, HOW MANY DAYS PER WEEK DO YOU ENGAGE IN MODERATE TO STRENUOUS EXERCISE (LIKE A BRISK WALK)?: 7 DAYS

## 2019-10-01 ASSESSMENT — PATIENT HEALTH QUESTIONNAIRE - PHQ9
3. TROUBLE FALLING OR STAYING ASLEEP: 3
SUM OF ALL RESPONSES TO PHQ QUESTIONS 1-9: 11
SUM OF ALL RESPONSES TO PHQ9 QUESTIONS 1 & 2: 3
7. TROUBLE CONCENTRATING ON THINGS, SUCH AS READING THE NEWSPAPER OR WATCHING TELEVISION: 2
9. THOUGHTS THAT YOU WOULD BE BETTER OFF DEAD, OR OF HURTING YOURSELF: 0
SUM OF ALL RESPONSES TO PHQ QUESTIONS 1-9: 11
2. FEELING DOWN, DEPRESSED OR HOPELESS: 2
5. POOR APPETITE OR OVEREATING: 0
6. FEELING BAD ABOUT YOURSELF - OR THAT YOU ARE A FAILURE OR HAVE LET YOURSELF OR YOUR FAMILY DOWN: 0
1. LITTLE INTEREST OR PLEASURE IN DOING THINGS: 1
8. MOVING OR SPEAKING SO SLOWLY THAT OTHER PEOPLE COULD HAVE NOTICED. OR THE OPPOSITE, BEING SO FIGETY OR RESTLESS THAT YOU HAVE BEEN MOVING AROUND A LOT MORE THAN USUAL: 0
4. FEELING TIRED OR HAVING LITTLE ENERGY: 3
10. IF YOU CHECKED OFF ANY PROBLEMS, HOW DIFFICULT HAVE THESE PROBLEMS MADE IT FOR YOU TO DO YOUR WORK, TAKE CARE OF THINGS AT HOME, OR GET ALONG WITH OTHER PEOPLE: 0

## 2019-10-01 ASSESSMENT — ENCOUNTER SYMPTOMS
ABDOMINAL PAIN: 0
COUGH: 0
CONSTIPATION: 0
VOMITING: 0
SHORTNESS OF BREATH: 0
DIARRHEA: 1
NAUSEA: 0
SORE THROAT: 0

## 2019-10-04 ENCOUNTER — HOSPITAL ENCOUNTER (OUTPATIENT)
Age: 29
Discharge: HOME | End: 2019-10-04
Payer: COMMERCIAL

## 2019-10-04 DIAGNOSIS — E66.01 CLASS 3 SEVERE OBESITY DUE TO EXCESS CALORIES WITH BODY MASS INDEX (BMI) OF 45.0 TO 49.9 IN ADULT, UNSPECIFIED WHETHER SERIOUS COMORBIDITY PRESENT (HCC): ICD-10-CM

## 2019-10-04 DIAGNOSIS — R74.8 ELEVATED LIVER ENZYMES: ICD-10-CM

## 2019-10-04 DIAGNOSIS — D58.2 ELEVATED HEMOGLOBIN (HCC): ICD-10-CM

## 2019-10-04 LAB
ALBUMIN SERPL-MCNC: 4.2 G/DL (ref 3.5–5.2)
ALP BLD-CCNC: 105 U/L (ref 40–129)
ALT SERPL-CCNC: 66 U/L (ref 0–40)
ANION GAP SERPL CALCULATED.3IONS-SCNC: 11 MMOL/L (ref 7–16)
AST SERPL-CCNC: 80 U/L (ref 0–39)
ATYPICAL LYMPHOCYTE RELATIVE PERCENT: 28 % (ref 0–4)
BASOPHILS ABSOLUTE: 0.17 E9/L (ref 0–0.2)
BASOPHILS RELATIVE PERCENT: 2 % (ref 0–2)
BILIRUB SERPL-MCNC: 0.5 MG/DL (ref 0–1.2)
BUN BLDV-MCNC: 8 MG/DL (ref 6–20)
CALCIUM SERPL-MCNC: 9.2 MG/DL (ref 8.6–10.2)
CHLORIDE BLD-SCNC: 105 MMOL/L (ref 98–107)
CHOLESTEROL, TOTAL: 156 MG/DL (ref 0–199)
CO2: 25 MMOL/L (ref 22–29)
CREAT SERPL-MCNC: 0.9 MG/DL (ref 0.7–1.2)
EOSINOPHILS ABSOLUTE: 0.09 E9/L (ref 0.05–0.5)
EOSINOPHILS RELATIVE PERCENT: 1 % (ref 0–6)
GFR AFRICAN AMERICAN: >60
GFR NON-AFRICAN AMERICAN: >60 ML/MIN/1.73
GLUCOSE BLD-MCNC: 89 MG/DL (ref 74–99)
HBA1C MFR BLD: 5.8 % (ref 4–5.6)
HCT VFR BLD CALC: 47.4 % (ref 37–54)
HDLC SERPL-MCNC: 27 MG/DL
HEMOGLOBIN: 16.5 G/DL (ref 12.5–16.5)
LDL CHOLESTEROL CALCULATED: 94 MG/DL (ref 0–99)
LYMPHOCYTES ABSOLUTE: 4.7 E9/L (ref 1.5–4)
LYMPHOCYTES RELATIVE PERCENT: 26 % (ref 20–42)
MCH RBC QN AUTO: 29.6 PG (ref 26–35)
MCHC RBC AUTO-ENTMCNC: 34.8 % (ref 32–34.5)
MCV RBC AUTO: 84.9 FL (ref 80–99.9)
MONOCYTES ABSOLUTE: 0.44 E9/L (ref 0.1–0.95)
MONOCYTES RELATIVE PERCENT: 5 % (ref 2–12)
NEUTROPHILS ABSOLUTE: 3.31 E9/L (ref 1.8–7.3)
NEUTROPHILS RELATIVE PERCENT: 38 % (ref 43–80)
PDW BLD-RTO: 12.7 FL (ref 11.5–15)
PLATELET # BLD: 175 E9/L (ref 130–450)
PMV BLD AUTO: 11.9 FL (ref 7–12)
POTASSIUM SERPL-SCNC: 4.5 MMOL/L (ref 3.5–5)
RBC # BLD: 5.58 E12/L (ref 3.8–5.8)
RBC # BLD: NORMAL 10*6/UL
SODIUM BLD-SCNC: 141 MMOL/L (ref 132–146)
TOTAL PROTEIN: 7.5 G/DL (ref 6.4–8.3)
TRIGL SERPL-MCNC: 177 MG/DL (ref 0–149)
VLDLC SERPL CALC-MCNC: 35 MG/DL
WBC # BLD: 8.7 E9/L (ref 4.5–11.5)

## 2019-10-04 PROCEDURE — 85025 COMPLETE CBC W/AUTO DIFF WBC: CPT

## 2019-10-04 PROCEDURE — 80053 COMPREHEN METABOLIC PANEL: CPT

## 2019-10-04 PROCEDURE — 80061 LIPID PANEL: CPT

## 2019-10-04 PROCEDURE — 83036 HEMOGLOBIN GLYCOSYLATED A1C: CPT

## 2019-10-04 PROCEDURE — 36415 COLL VENOUS BLD VENIPUNCTURE: CPT

## 2019-10-07 DIAGNOSIS — R74.8 ELEVATED LIVER ENZYMES: Primary | ICD-10-CM

## 2019-10-09 ENCOUNTER — HOSPITAL ENCOUNTER (OUTPATIENT)
Dept: ULTRASOUND IMAGING | Age: 29
Discharge: HOME | End: 2019-10-11
Payer: COMMERCIAL

## 2019-10-09 DIAGNOSIS — R74.8 ELEVATED LIVER ENZYMES: ICD-10-CM

## 2019-10-09 PROCEDURE — 76705 ECHO EXAM OF ABDOMEN: CPT

## 2019-10-16 DIAGNOSIS — G43.109 MIGRAINE WITH AURA AND WITHOUT STATUS MIGRAINOSUS, NOT INTRACTABLE: ICD-10-CM

## 2019-12-02 ENCOUNTER — OFFICE VISIT (OUTPATIENT)
Dept: FAMILY MEDICINE CLINIC | Age: 29
End: 2019-12-02
Payer: COMMERCIAL

## 2019-12-02 VITALS
SYSTOLIC BLOOD PRESSURE: 112 MMHG | OXYGEN SATURATION: 98 % | DIASTOLIC BLOOD PRESSURE: 72 MMHG | BODY MASS INDEX: 46.65 KG/M2 | WEIGHT: 315 LBS | HEART RATE: 75 BPM | RESPIRATION RATE: 18 BRPM | HEIGHT: 69 IN

## 2019-12-02 DIAGNOSIS — F33.42 RECURRENT MAJOR DEPRESSIVE DISORDER, IN FULL REMISSION (HCC): Primary | ICD-10-CM

## 2019-12-02 DIAGNOSIS — M79.7 FIBROMYALGIA: ICD-10-CM

## 2019-12-02 PROBLEM — D58.2 ELEVATED HEMOGLOBIN (HCC): Status: ACTIVE | Noted: 2019-12-02

## 2019-12-02 PROBLEM — E66.01 SEVERE OBESITY (HCC): Status: ACTIVE | Noted: 2019-12-02

## 2019-12-02 PROCEDURE — 99213 OFFICE O/P EST LOW 20 MIN: CPT | Performed by: FAMILY MEDICINE

## 2019-12-02 RX ORDER — CYCLOBENZAPRINE HCL 10 MG
10 TABLET ORAL NIGHTLY PRN
Qty: 30 TABLET | Refills: 0 | Status: SHIPPED
Start: 2019-12-02 | End: 2020-02-11 | Stop reason: SDUPTHER

## 2019-12-02 ASSESSMENT — ENCOUNTER SYMPTOMS
NAUSEA: 0
VOMITING: 0
ABDOMINAL PAIN: 0
COUGH: 0
SHORTNESS OF BREATH: 0

## 2019-12-16 ENCOUNTER — OFFICE VISIT (OUTPATIENT)
Dept: NEUROLOGY | Age: 29
End: 2019-12-16
Payer: COMMERCIAL

## 2019-12-16 VITALS
WEIGHT: 315 LBS | OXYGEN SATURATION: 96 % | DIASTOLIC BLOOD PRESSURE: 82 MMHG | TEMPERATURE: 99.8 F | HEART RATE: 88 BPM | RESPIRATION RATE: 12 BRPM | SYSTOLIC BLOOD PRESSURE: 116 MMHG | HEIGHT: 69 IN | BODY MASS INDEX: 46.65 KG/M2

## 2019-12-16 DIAGNOSIS — G43.109 MIGRAINE WITH AURA AND WITHOUT STATUS MIGRAINOSUS, NOT INTRACTABLE: ICD-10-CM

## 2019-12-16 PROCEDURE — 99213 OFFICE O/P EST LOW 20 MIN: CPT | Performed by: NURSE PRACTITIONER

## 2019-12-16 RX ORDER — RIZATRIPTAN BENZOATE 10 MG/1
10 TABLET ORAL DAILY PRN
Qty: 15 TABLET | Refills: 2 | Status: SHIPPED
Start: 2019-12-16 | End: 2021-11-09

## 2019-12-16 RX ORDER — HYDROXYZINE HYDROCHLORIDE 10 MG/1
1 TABLET, FILM COATED ORAL NIGHTLY
Refills: 0 | COMMUNITY
Start: 2019-12-10 | End: 2021-01-13

## 2019-12-17 PROBLEM — E66.9 OBESE: Status: ACTIVE | Noted: 2019-12-02

## 2020-02-11 RX ORDER — CYCLOBENZAPRINE HCL 10 MG
10 TABLET ORAL NIGHTLY PRN
Qty: 30 TABLET | Refills: 0 | Status: SHIPPED
Start: 2020-02-11 | End: 2020-04-13

## 2020-04-13 RX ORDER — CYCLOBENZAPRINE HCL 10 MG
10 TABLET ORAL NIGHTLY PRN
Qty: 30 TABLET | Refills: 0 | Status: SHIPPED
Start: 2020-04-13 | End: 2020-05-08

## 2020-05-08 RX ORDER — CYCLOBENZAPRINE HCL 10 MG
10 TABLET ORAL NIGHTLY PRN
Qty: 30 TABLET | Refills: 0 | Status: SHIPPED
Start: 2020-05-08 | End: 2020-05-19 | Stop reason: SDUPTHER

## 2020-05-08 NOTE — TELEPHONE ENCOUNTER
Last Appointment   12/2/2019  Next Appointment  Visit date not found    Per last OV note - return around 6/2/20  LM for pt to call back to schedule follow up appt

## 2020-05-19 ENCOUNTER — TELEPHONE (OUTPATIENT)
Dept: ADMINISTRATIVE | Age: 30
End: 2020-05-19

## 2020-05-19 ENCOUNTER — VIRTUAL VISIT (OUTPATIENT)
Dept: FAMILY MEDICINE CLINIC | Age: 30
End: 2020-05-19
Payer: COMMERCIAL

## 2020-05-19 PROCEDURE — 99213 OFFICE O/P EST LOW 20 MIN: CPT | Performed by: FAMILY MEDICINE

## 2020-05-19 RX ORDER — CYCLOBENZAPRINE HCL 10 MG
10 TABLET ORAL NIGHTLY PRN
Qty: 30 TABLET | Refills: 2 | Status: SHIPPED | OUTPATIENT
Start: 2020-05-19 | End: 2020-08-17

## 2020-05-19 ASSESSMENT — ENCOUNTER SYMPTOMS
NAUSEA: 0
ABDOMINAL PAIN: 0
BACK PAIN: 1
VOMITING: 0

## 2020-05-19 NOTE — PROGRESS NOTES
gel as needed Yes Historical Provider, MD   buPROPion (WELLBUTRIN XL) 150 MG extended release tablet Take 150 mg by mouth every morning Yes Historical Provider, MD   valACYclovir (VALTREX) 1 g tablet Take 500 mg by mouth daily  Yes Historical Provider, MD   Multiple Vitamins-Minerals (THERAPEUTIC MULTIVITAMIN-MINERALS) tablet Take 1 tablet by mouth daily Yes Historical Provider, MD       Social History     Tobacco Use    Smoking status: Former Smoker     Last attempt to quit: 10/1/2016     Years since quitting: 3.6    Smokeless tobacco: Never Used   Substance Use Topics    Alcohol use: Yes     Frequency: Monthly or less     Comment: rare    Drug use: Yes     Types: Marijuana     Comment: daily, for sleep and pain       PHYSICAL EXAMINATION:    Constitutional: [x] Appears well-developed and well-nourished [x] No apparent distress      [] Abnormal-   Mental status  [x] Alert and awake  [x] Oriented to person/place/time [x]Able to follow commands      Eyes:  Sclera  [x]  Normal  [] Abnormal -         Discharge [x]  None visible  [] Abnormal -    HENT:   [x] Normocephalic, atraumatic. [] Abnormal   [] Mouth/Throat: Mucous membranes are moist.     External Ears [x] Normal  [] Abnormal-     Neck: [x] No visualized mass     Pulmonary/Chest: [x] Respiratory effort normal.  [x] No visualized signs of difficulty breathing or respiratory distress        [] Abnormal-     Other pertinent observable physical exam findings- none    ASSESSMENT/PLAN:  1. Fibromyalgia  -stable, needs refill on flexeril for back spasms. No new symptoms to report. - cyclobenzaprine (FLEXERIL) 10 MG tablet; Take 1 tablet by mouth nightly as needed for Muscle spasms  Dispense: 30 tablet; Refill: 2      Return in about 6 months (around 11/19/2020). Cassy June is a 34 y.o. adult being evaluated by a Virtual Visit (video visit) encounter to address concerns as mentioned above. A caregiver was present when appropriate.  Due to this being a TeleHealth encounter (During ZYSIN-83 public health emergency), evaluation of the following organ systems was limited: Vitals/Constitutional/EENT/Resp/CV/GI//MS/Neuro/Skin/Heme-Lymph-Imm. Pursuant to the emergency declaration under the [facility], [address] [place] [address] and the [employer] Act, this Virtual Visit was conducted with patient's (and/or legal guardian's) consent, to reduce the patient's risk of exposure to COVID-19 and provide necessary medical care. The patient (and/or legal guardian) has also been advised to contact this office for worsening conditions or problems, and seek emergency medical treatment and/or call 911 if deemed necessary. Patient identification was verified at the start of the visit: Yes    Total time spent on this encounter: Not billed by time    Services were provided through a video synchronous discussion virtually to substitute for in-person clinic visit. Patient and provider were located at their individual homes. --Bettye Cunningham MD on 5/19/2020 at 3:20 PM    An electronic signature was used to authenticate this note.

## 2020-05-19 NOTE — PROGRESS NOTES
TeleMedicine Patient Consent    This visit was performed as a virtual video visit using a synchronous, two-way, audio-video telehealth technology platform. Patient identification was verified at the start of the visit, including the patient's telephone number and physical location. I discussed with the patient the nature of our telehealth visits, that:     1. Due to the nature of an audio- video modality, the only components of a physical exam that could be done are the elements supported by direct observation. 2. The provider will evaluate the patient and recommend diagnostics and treatments based on their assessment. 3. If it was felt that the patient should be evaluated in clinic or an emergency room setting, then they would be directed there. 4. Our sessions are not being recorded and that personal health information is protected. 5. Our team would provide follow up care in person if/when the patient needs it. Patient does agree to proceed with telemedicine consultation. Patient location: home address in [facility]    Physician location: regular office location    This visit was completed virtually using Doxy. me    This visit was performed during the 2647 public health crisis and COVID-19 pandemic.   *Add GT modifier to all Video Visits*

## 2020-05-19 NOTE — TELEPHONE ENCOUNTER
Pt called pre service because he got a message from office about his virtual visit. I transferred pt to office.

## 2020-05-19 NOTE — PROGRESS NOTES
Dom 450 Video Visit Precepting Note    Subjective: This Telehealth visit was performed as two-way, audio-video technology platform. Verbal consent was taken from patient as noted in resident's chart. Follow up back spasms and takes flexeril PRN for flare ups. Objective:  General appearance: As noted in resident's chart. Home health data if available    Assessment/Plan:  Muscle spasm - PRN flexeril     Attending Physician Statement  I have reviewed the chart, including any radiology or labs. I have discussed the case, including pertinent history with the resident. I agree with the assessment, plan and orders as documented by the resident. Please refer to the resident note for additional information.     Electronically signed by Zarina Tijerina MD on 5/19/20 at 2:43 PM EDT

## 2020-07-26 RX ORDER — ERENUMAB-AOOE 140 MG/ML
140 INJECTION, SOLUTION SUBCUTANEOUS
Qty: 3 PEN | Refills: 1 | Status: SHIPPED
Start: 2020-07-26 | End: 2021-02-27 | Stop reason: SDUPTHER

## 2020-07-27 ENCOUNTER — TELEPHONE (OUTPATIENT)
Dept: NEUROLOGY | Age: 30
End: 2020-07-27

## 2020-07-27 NOTE — TELEPHONE ENCOUNTER
[facility] approved 6/27/20-7/27/21 with Meera Hernández, case ID #[id number]. [facility] OLY JACOB for pt to call office and schedule follow up appt.   Electronically signed by Anastacia Maynard on 7/27/20 at 9:36 AM EDT

## 2020-08-04 ENCOUNTER — OFFICE VISIT (OUTPATIENT)
Dept: NEUROLOGY | Age: 30
End: 2020-08-04
Payer: COMMERCIAL

## 2020-08-04 VITALS
RESPIRATION RATE: 12 BRPM | BODY MASS INDEX: 33.33 KG/M2 | DIASTOLIC BLOOD PRESSURE: 86 MMHG | SYSTOLIC BLOOD PRESSURE: 118 MMHG | TEMPERATURE: 98 F | HEIGHT: 69 IN | HEART RATE: 92 BPM | WEIGHT: 225 LBS

## 2020-08-04 PROCEDURE — 99213 OFFICE O/P EST LOW 20 MIN: CPT | Performed by: NURSE PRACTITIONER

## 2020-08-04 NOTE — PROGRESS NOTES
[address] [facility]. Ana Cristina Luciano M.D., F.A.C.P. Karoline Adams, DNP, APRN, CNS  Rajani Cao. Wing Funez, MSN, APRN-FNP-C  Madeleine Cota MSN, APRN, FNP-C  Pati STILL, DEVORAH Keenanvgavlvkg 207 MSN, APRN, FNP-C  [address], [facility]  [place], [address]  Phone: [phone]  Fax: [phone]       Nish Manzo" is a 31 y.o. right handed individual currently undergoing gender reassignment   Born female and transitioning to male    We are following him for migraines     Overall migraine control is good on max dose Aimovig 140 mg with no GI issues. He did have a ramping up of his headaches in the the past few weeks--in the setting of recent high temperatures and getting off schedule with his injections. Maxalt is effective for rescue and he does not often need to re-dose. Failed Topamax, Imitrex, and amitriptyline     He has been having cramping and uterine pain and will undergo partial hysterectomy in the fall. He remains on testosterone treatments.   No other new medical issues to report    No chest pain or palpitations  No SOB  No vertigo, lightheadedness or loss of consciousness  No falls, tripping or stumbling  No incontinence of bowels or bladder  No itching or bruising appreciated  No numbness, tingling or focal arm/leg weakness  No speech or swallowing troubles    ROS otherwise negative     Current Outpatient Medications   Medication Sig Dispense Refill    Erenumab-aooe (AIMOVIG) 140 MG/ML SOAJ Inject 140 mg into the skin every 30 days 3 pen 1    cyclobenzaprine (FLEXERIL) 10 MG tablet Take 1 tablet by mouth nightly as needed for Muscle spasms 30 tablet 2    hydrOXYzine (ATARAX) 10 MG tablet Take 1 tablet by mouth nightly  0    rizatriptan (MAXALT) 10 MG tablet Take 1 tablet by mouth daily as needed for Migraine May repeat in 2 hours if needed; no more than 2 doses in 24 hours 15 tablet 2    Etonogestrel (NEXPLANON SC) Inject into the skin      HALOBETASOL PROPIONATE 0.05 % FOAM Apply topically      DEPO-TESTOSTERONE 200 MG/ML injection Inject 0.5 mLs into the muscle once a week.   0    clindamycin (CLINDAGEL) 1 % gel as needed      buPROPion (WELLBUTRIN XL) 150 MG extended release tablet Take 150 mg by mouth every morning      valACYclovir (VALTREX) 1 g tablet Take 500 mg by mouth daily       Multiple Vitamins-Minerals (THERAPEUTIC MULTIVITAMIN-MINERALS) tablet Take 1 tablet by mouth daily       No current facility-administered medications for this visit.       Objective:     /86 (Site: Left Upper Arm, Position: Sitting, Cuff Size: Large Adult)   Pulse 92   Temp 98 °F (36.7 °C) (Infrared)   Resp 12   Ht 5' 9\" (1.753 m)   Wt 225 lb (102.1 kg)   BMI 33.23 kg/m²     General appearance: alert, appears stated age, cooperative and in no distress---obese  Head: normocephalic, without obvious abnormality, atraumatic--hirsute  Eyes: conjunctivae/corneas clear; no drainage  Neck: no carotid bruit,---full ROM  Lungs: clear to auscultation bilaterally  Heart: regular rate and rhythm---no murmur  Extremities: normal, atraumatic, no cyanosis or edema  Pulses: 2+ and symmetric  Skin: No rashes or lesions    Mental Status: alert and oriented x 4----very pleasant     Appropriate attention/concentration  Intact fundus of knowledge  Repetition intact  Memories intact    Speech: no dysarthria  Language: no aphasias    Cranial Nerves:  I: smell    II: visual acuity     II: visual fields Full    II: pupils DONOVAN   III,VII: ptosis None   III,IV,VI: extraocular muscles  EOMI without nystagmus   V: mastication Normal   V: facial light touch sensation  Normal    V,VII: corneal reflex     VII: facial muscle function - upper  Normal   VII: facial muscle function - lower Normal   VIII: hearing Normal   IX: soft palate elevation  Normal   IX,X: gag reflex    XI: trapezius strength  5/5   XI: sternocleidomastoid strength 5/5   XI: neck extension strength  5/5   XII: tongue strength  Normal     Motor:  5/5 throughout  Obese bulk and normal tone  No drift   No abnormal movements    Sensory:  LT normal    Coordination:   FN, FFM and MAGDALENO normal    Gait:  Normal    DTR:   Right Brachioradialis reflex 2+  Left Brachioradialis reflex 2+  Right Biceps reflex 1+  Left Biceps reflex 1+  Right Quadriceps reflex 2+  Left Quadriceps reflex 2+  Right Achilles reflex 2+  Left Achilles reflex 2+    No Scales's    No other pathological reflexes    Laboratory/Radiology:  ry/Radiology:   No current labs or images to review    Assessment:     Chronic migraine HAs with aura   Good headache control on max dose Aimovig   Maxalt effective for rescue and exam unchanged     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

## 2020-08-04 NOTE — PATIENT INSTRUCTIONS
your migraines. When should you call for help? [id number] anytime you think you may need emergency care. For example, call if:  · You have signs of a stroke. These may include:  ? Sudden numbness, paralysis, or weakness in your face, arm, or leg, especially on only one side of your body. ? Sudden vision changes. ? Sudden trouble speaking. ? Sudden confusion or trouble understanding simple statements. ? Sudden problems with walking or balance. ? A sudden, severe headache that is different from past headaches. Call your doctor now or seek immediate medical care if:  · You have new or worse nausea and vomiting. · You have a new or higher fever. · Your headache gets much worse. Watch closely for changes in your health, and be sure to contact your doctor if:  · You are not getting better after 2 days (48 hours). Where can you learn more? Go to https://Notice [employer] Solution[employer]wThe Online 401.RolePoint. org and sign in to your FlipKey account. Enter R643 in the [employer] box to learn more about \"Migraine Headache: Care Instructions. \"     If you do not have an account, please click on the \"Sign Up Now\" link. Current as of: November 20, 2019               Content Version: 12.5  © 1859-4040 Healthwise, Incorporated. Care instructions adapted under license by [facility] ([facility]). If you have questions about a medical condition or this instruction, always ask your healthcare professional. [place] [address] any warranty or liability for your use of this information.

## 2020-12-29 ENCOUNTER — TELEPHONE (OUTPATIENT)
Dept: NEUROLOGY | Age: 30
End: 2020-12-29

## 2020-12-29 NOTE — LETTER
[facility]  [address] [facility],  [address]  [address] [place] [address]  Phone: [phone]  Fax: [phone]        December 29, 2020    Araceli Dumas  [address] [facility] [address]      Dear Laura Risk: We have been unable to reach you by phone to schedule your follow up appointment with Merly Granda. Please contact us as soon as possible to avoid any potential delays in your care. If you have already scheduled your appointment, please disregard this letter. Thank you.     Sincerely,      [facility] ([facility]) Neurology Staff

## 2021-01-12 NOTE — PROGRESS NOTES
[address] [facility]. Lamont Naqvi M.D., F.A.C.P. Ana Shirley, ZAKIA, APRN, CNS  Julianna Evans. Belvie Nageotte, MSN, APRN-FNP-C  Sera Thompson MSN, APRN, FNP-C  María Elena STILL, PACarmenC  Nadine Alonso MSN, APRN, FNP-C  [address], [facility]  [place], [address]  Phone: [phone]  Fax: [phone]         Sharlene Raymond" is a 31 y.o. right handed individual     We are following him for migraines    He presents alone is an excellent historian    Migraines remain very well controlled on max dose Aimovig, with no issues with tolerance. Only getting a few headaches here and there, which are aborted with Maxalt. Occasional sinus headaches as well. He is overall very pleased with his progress. He continues to go through gender reassignment treatments on is on testosterone. He had his partial hysterectomy in the fall, which has resolved his bothersome cramping. Failed Topamax, Imitrex, and amitriptyline     No new medical issues.   He just got a new kitten named Smudge    No chest pain or palpitations  No SOB  No vertigo, lightheadedness or loss of consciousness  No falls, tripping or stumbling  No incontinence of bowels or bladder  No itching or bruising appreciated  No numbness, tingling or focal arm/leg weakness  No speech or swallowing troubles    ROS otherwise negative     Current Outpatient Medications   Medication Sig Dispense Refill    cyclobenzaprine (FLEXERIL) 10 MG tablet Take 1 tablet by mouth nightly as needed      valACYclovir (VALTREX) 500 MG tablet Take 1 tablet by mouth daily      Erenumab-aooe (AIMOVIG) 140 MG/ML SOAJ Inject 140 mg into the skin every 30 days 3 pen 1    rizatriptan (MAXALT) 10 MG tablet Take 1 tablet by mouth daily as needed for Migraine May repeat in 2 hours if needed; no more than 2 doses in 24 hours 15 tablet 2    HALOBETASOL PROPIONATE 0.05 % FOAM Apply topically      DEPO-TESTOSTERONE 200 MG/ML injection Inject 0.5 mLs into Brachioradialis reflex 2+  Left Brachioradialis reflex 2+  Right Biceps reflex 1+  Left Biceps reflex 1+  Right Quadriceps reflex 2+  Left Quadriceps reflex 2+  Right Achilles reflex 2+  Left Achilles reflex 2+    No Scales's    No other pathological reflexes    Laboratory/Radiology:  ry/Radiology:     No current labs or images to review    Assessment:     Chronic migraine HAs with aura: excellent control on max dose Aimovig, with Maxalt effective for rescue.  Exam is normal    Recurrent HSV: on acyclovir    Plan:     Continue Aimovig 140 mg SQ monthly    Continue Maxalt PRN    RTO in 6 months or sooner PRN    MESHA Bonner--CNP  5:04 PM  1/12/2021

## 2021-01-13 ENCOUNTER — OFFICE VISIT (OUTPATIENT)
Dept: NEUROLOGY | Age: 31
End: 2021-01-13
Payer: COMMERCIAL

## 2021-01-13 VITALS
TEMPERATURE: 97 F | HEART RATE: 88 BPM | RESPIRATION RATE: 12 BRPM | BODY MASS INDEX: 45.91 KG/M2 | SYSTOLIC BLOOD PRESSURE: 110 MMHG | WEIGHT: 310 LBS | DIASTOLIC BLOOD PRESSURE: 78 MMHG | HEIGHT: 69 IN

## 2021-01-13 DIAGNOSIS — G43.109 MIGRAINE WITH AURA AND WITHOUT STATUS MIGRAINOSUS, NOT INTRACTABLE: Primary | ICD-10-CM

## 2021-01-13 PROCEDURE — 99213 OFFICE O/P EST LOW 20 MIN: CPT | Performed by: NURSE PRACTITIONER

## 2021-01-13 RX ORDER — VALACYCLOVIR HYDROCHLORIDE 500 MG/1
1 TABLET, FILM COATED ORAL DAILY
COMMUNITY
Start: 2020-12-21

## 2021-01-13 RX ORDER — CYCLOBENZAPRINE HCL 10 MG
1 TABLET ORAL NIGHTLY PRN
COMMUNITY
Start: 2020-12-31

## 2021-01-13 NOTE — PATIENT INSTRUCTIONS

## 2021-02-26 DIAGNOSIS — G43.109 MIGRAINE WITH AURA AND WITHOUT STATUS MIGRAINOSUS, NOT INTRACTABLE: ICD-10-CM

## 2021-02-27 RX ORDER — ERENUMAB-AOOE 140 MG/ML
140 INJECTION, SOLUTION SUBCUTANEOUS
Qty: 3 PEN | Refills: 1 | Status: SHIPPED
Start: 2021-02-27 | End: 2021-08-13 | Stop reason: SDUPTHER

## 2021-07-06 ENCOUNTER — TELEPHONE (OUTPATIENT)
Dept: NEUROLOGY | Age: 31
End: 2021-07-06

## 2021-07-06 NOTE — TELEPHONE ENCOUNTER
[place] Approval of Aimovig 140 mg/mL - [id number], Valid 6/28/2021 - 12/29/2021.   Electronically signed by Mariella Alford on 7/6/21 at 11:33 AM EDT

## 2021-08-13 ENCOUNTER — OFFICE VISIT (OUTPATIENT)
Dept: NEUROLOGY | Age: 31
End: 2021-08-13
Payer: COMMERCIAL

## 2021-08-13 VITALS
DIASTOLIC BLOOD PRESSURE: 82 MMHG | SYSTOLIC BLOOD PRESSURE: 136 MMHG | HEART RATE: 73 BPM | TEMPERATURE: 98 F | HEIGHT: 69 IN | OXYGEN SATURATION: 95 % | WEIGHT: 315 LBS | BODY MASS INDEX: 46.65 KG/M2

## 2021-08-13 DIAGNOSIS — G43.109 MIGRAINE WITH AURA AND WITHOUT STATUS MIGRAINOSUS, NOT INTRACTABLE: Primary | ICD-10-CM

## 2021-08-13 PROCEDURE — 99213 OFFICE O/P EST LOW 20 MIN: CPT | Performed by: NURSE PRACTITIONER

## 2021-08-13 RX ORDER — ERENUMAB-AOOE 140 MG/ML
140 INJECTION, SOLUTION SUBCUTANEOUS
Qty: 3 PEN | Refills: 1 | Status: SHIPPED
Start: 2021-08-13 | End: 2022-02-01 | Stop reason: SDUPTHER

## 2021-08-13 RX ORDER — ERENUMAB-AOOE 140 MG/ML
140 INJECTION, SOLUTION SUBCUTANEOUS
Qty: 3 PEN | Refills: 1 | Status: SHIPPED
Start: 2021-08-13 | End: 2021-08-13

## 2021-08-13 RX ORDER — ERENUMAB-AOOE 140 MG/ML
140 INJECTION, SOLUTION SUBCUTANEOUS
Qty: 2 PEN | Refills: 0 | COMMUNITY
Start: 2021-08-13 | End: 2022-02-01 | Stop reason: SDUPTHER

## 2021-08-13 NOTE — PATIENT INSTRUCTIONS
Patient Education        Migraine Headache: Care Instructions  Overview     Migraines are painful, throbbing headaches that often start on one side of the head. They may cause nausea and vomiting and make you sensitive to light, sound, or smell. Without treatment, migraines can last from 4 hours to a few days. Medicines can help prevent migraines or stop them after they have started. Your doctor can help you find which ones work best for you. Follow-up care is a key part of your treatment and safety. Be sure to make and go to all appointments, and call your doctor if you are having problems. It's also a good idea to know your test results and keep a list of the medicines you take. How can you care for yourself at home? · Do not drive if you have taken a prescription pain medicine. · Rest in a quiet, dark room until your headache is gone. Close your eyes, and try to relax or go to sleep. Don't watch TV or read. · Put a cold, moist cloth or cold pack on the painful area for 10 to 20 minutes at a time. Put a thin cloth between the cold pack and your skin. · Use a warm, moist towel or a heating pad set on low to relax tight shoulder and neck muscles. · Have someone gently massage your neck and shoulders. · Take your medicines exactly as prescribed. Call your doctor if you think you are having a problem with your medicine. You will get more details on the specific medicines your doctor prescribes. · Don't take medicine for headache pain too often. Talk to your doctor if you are taking medicine more than 2 days a week to stop a headache. Taking too much pain medicine can lead to more headaches. These are called medicine-overuse headaches. To prevent migraines  · Keep a headache diary so you can figure out what triggers your headaches. Avoiding triggers may help you prevent headaches. Record when each headache began, how long it lasted, and what the pain was like.  Write down any other symptoms you had with the headache, such as nausea, flashing lights or dark spots, or sensitivity to bright light or loud noise. Note if the headache occurred near your period. List anything that might have triggered the headache. Triggers may include certain foods (chocolate, cheese, wine) or odors, smoke, bright light, stress, or lack of sleep. · If your doctor has prescribed medicine for your migraines, take it as directed. You may have medicine that you take only when you get a migraine and medicine that you take all the time to help prevent migraines. ? If your doctor has prescribed medicine for when you get a headache, take it at the first sign of a migraine, unless your doctor has given you other instructions. ? If your doctor has prescribed medicine to prevent migraines, take it exactly as prescribed. Call your doctor if you think you are having a problem with your medicine. · Find healthy ways to deal with stress. Migraines are most common during or right after stressful times. Try finding ways to reduce stress like practicing mindfulness or deep breathing exercises. · Get plenty of sleep and exercise. But be careful to not push yourself too hard during exercise. It may trigger a headache. · Eat meals on a regular schedule. Avoid foods and drinks that often trigger migraines. These include chocolate, alcohol (especially red wine and port), aspartame, monosodium glutamate (MSG), and some additives found in foods (such as hot dogs, hale, cold cuts, aged cheeses, and pickled foods). · Limit caffeine. Don't drink too much coffee, tea, or soda. But don't quit caffeine suddenly. That can also give you migraines. · Do not smoke or allow others to smoke around you. If you need help quitting, talk to your doctor about stop-smoking programs and medicines. These can increase your chances of quitting for good.   · If you are taking birth control pills or hormone therapy, talk to your doctor about whether they are triggering your migraines. When should you call for help? Call 911 anytime you think you may need emergency care. For example, call if:    · You have signs of a stroke. These may include:  ? Sudden numbness, paralysis, or weakness in your face, arm, or leg, especially on only one side of your body. ? Sudden vision changes. ? Sudden trouble speaking. ? Sudden confusion or trouble understanding simple statements. ? Sudden problems with walking or balance. ? A sudden, severe headache that is different from past headaches. Call your doctor now or seek immediate medical care if:    · You have new or worse nausea and vomiting.     · You have a new or higher fever.     · Your headache gets much worse. Watch closely for changes in your health, and be sure to contact your doctor if:    · You are not getting better after 2 days (48 hours). Where can you learn more? Go to https://[facility] Maype[employer].SurveySnap. org and sign in to your Definition 6 account. Enter J440 in the Sagebin box to learn more about \"Migraine Headache: Care Instructions. \"     If you do not have an account, please click on the \"Sign Up Now\" link. Current as of: August 4, 2020               Content Version: 12.9  © 2006-2021 Healthwise, [facility]. Care instructions adapted under license by [facility] ([facility]). If you have questions about a medical condition or this instruction, always ask your healthcare professional. [place] [address] any warranty or liability for your use of this information.

## 2021-08-13 NOTE — PROGRESS NOTES
[facility]. [facility]., M.D., F.A.C.P. Alfredo Baer, DNP, APRN, CNS  Scarlett Neff. Bree Liang, MSN, APRN-FNP-C  Laury Holcomb MSN, APRN, FNP-C  Dariela STILL, PA-C  [address] MSN, APRN, FNP-C  [address], [address]  [place], [address] [address]  Phone: [phone]  Fax: [phone]         Bronwyn Guillen" is a 36 y.o. 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 Unfortunately, due to insurance changes he has been out of his Aimovig for a month. Thankfully, no resurgence of his migraine headaches. He has not had to use Maxalt in a long time, but this typically works very well for headache rescue--- with no bothersome side effects. Medications failed: Topiramate, sumatriptan, amitriptyline. No other new medical issues. Current Outpatient Medications   Medication Sig Dispense Refill    Erenumab-aooe (AIMOVIG) 140 MG/ML SOAJ Inject 140 mg into the skin every 30 days 3 pen 1    cyclobenzaprine (FLEXERIL) 10 MG tablet Take 1 tablet by mouth nightly as needed      valACYclovir (VALTREX) 500 MG tablet Take 1 tablet by mouth daily      rizatriptan (MAXALT) 10 MG tablet Take 1 tablet by mouth daily as needed for Migraine May repeat in 2 hours if needed; no more than 2 doses in 24 hours 15 tablet 2    HALOBETASOL PROPIONATE 0.05 % FOAM Apply topically      DEPO-TESTOSTERONE 200 MG/ML injection Inject 0.5 mLs into the muscle once a week.   0    clindamycin (CLINDAGEL) 1 % gel as needed      buPROPion (WELLBUTRIN XL) 150 MG extended release tablet Take 150 mg by mouth every morning      Multiple Vitamins-Minerals (THERAPEUTIC MULTIVITAMIN-MINERALS) tablet Take 1 tablet by mouth daily       No current facility-administered medications for this visit. Objective:     /82 (Site: Right Upper Arm)   Pulse 73   Temp 98 °F (36.7 °C)   Ht 5' 9\" (1.753 m)   Wt (!) 315 lb (142.9 kg)   SpO2 95%   BMI 46.52 kg/m²     General appearance: alert, appears stated age, cooperative and in no distress  Head: normocephalic, without obvious abnormality, atraumatic--hirsute  Eyes: conjunctivae/corneas clear; no drainage  Neck: no carotid bruit,---full ROM  Lungs: clear to auscultation bilaterally  Heart: regular rate and rhythm---no murmur  Extremities: normal, atraumatic, no cyanosis or edema  Pulses: 2+ and symmetric  Skin: No rashes or lesions    Mental Status: alert and oriented x 4----very pleasant as always    Appropriate attention/concentration  Intact fundus of knowledge  Memories intact    Speech: no dysarthria  Language: no aphasias    Cranial Nerves:  I: smell    II: visual acuity     II: visual fields Full    II: pupils DONOVAN   III,VII: ptosis None   III,IV,VI: extraocular muscles  EOMI without nystagmus   V: mastication Normal   V: facial light touch sensation  Normal    V,VII: corneal reflex     VII: facial muscle function - upper  Normal   VII: facial muscle function - lower Normal   VIII: hearing Normal   IX: soft palate elevation  Normal   IX,X: gag reflex    XI: trapezius strength  5/5   XI: sternocleidomastoid strength 5/5   XI: neck extension strength  5/5   XII: tongue strength  Normal     Motor:  5/5 throughout  Overweight bulk and normal tone  No drift   No abnormal movements    Sensory:  LT normal    Coordination:   FN, FFM normal    Gait:  Normal    DTR:   Right Brachioradialis reflex 2+  Left Brachioradialis reflex 2+  Right Biceps reflex 1+  Left Biceps reflex 1+  Right Quadriceps reflex 2+  Left Quadriceps reflex 2+  Right Achilles reflex 2+  Left Achilles reflex 2+    No Scales's    No other pathological reflexes    Laboratory/Radiology:  ry/Radiology:     No current labs or images to review    Assessment:     Episodic migraine HAs with aura: excellent control on max dose Aimovig, with Maxalt effective for rescue.  Exam is normal    Recurrent HSV: on acyclovir    Plan:     Continue Aimovig 140 mg SQ monthly    Continue Maxalt PRN    RTO in 9 months or sooner PRN    Cristian Moreno, APRN - CNP  11:55 AM  8/13/2021

## 2021-11-06 DIAGNOSIS — G43.109 MIGRAINE WITH AURA AND WITHOUT STATUS MIGRAINOSUS, NOT INTRACTABLE: ICD-10-CM

## 2021-11-09 RX ORDER — RIZATRIPTAN BENZOATE 10 MG/1
TABLET ORAL
Qty: 15 TABLET | Refills: 2 | Status: SHIPPED
Start: 2021-11-09 | End: 2022-09-14 | Stop reason: SDUPTHER

## 2022-02-01 DIAGNOSIS — G43.109 MIGRAINE WITH AURA AND WITHOUT STATUS MIGRAINOSUS, NOT INTRACTABLE: ICD-10-CM

## 2022-02-01 RX ORDER — ERENUMAB-AOOE 140 MG/ML
140 INJECTION, SOLUTION SUBCUTANEOUS
Qty: 3 PEN | Refills: 1 | Status: SHIPPED
Start: 2022-02-01 | End: 2022-05-31 | Stop reason: SDUPTHER

## 2022-05-31 ENCOUNTER — SCHEDULED TELEPHONE ENCOUNTER (OUTPATIENT)
Dept: NEUROLOGY | Age: 32
End: 2022-05-31
Payer: COMMERCIAL

## 2022-05-31 DIAGNOSIS — G43.109 MIGRAINE WITH AURA AND WITHOUT STATUS MIGRAINOSUS, NOT INTRACTABLE: Primary | ICD-10-CM

## 2022-05-31 PROBLEM — F33.42 RECURRENT MAJOR DEPRESSIVE DISORDER, IN FULL REMISSION (HCC): Status: RESOLVED | Noted: 2019-10-01 | Resolved: 2022-05-31

## 2022-05-31 PROCEDURE — 99441 PR PHYS/QHP TELEPHONE EVALUATION 5-10 MIN: CPT | Performed by: NURSE PRACTITIONER

## 2022-05-31 RX ORDER — ERENUMAB-AOOE 140 MG/ML
140 INJECTION, SOLUTION SUBCUTANEOUS
Qty: 3 PEN | Refills: 4 | Status: SHIPPED | OUTPATIENT
Start: 2022-05-31 | End: 2023-05-26

## 2022-05-31 NOTE — PROGRESS NOTES
[facility]. Ama Sun M.D., F.A.C.P. Jason Ochoa, ZAKIA, APRN, CNS  Maddi Russell. Doug Avery, MSN, APRN-FNP-C  José Luis Vu MSN, APRN, FNP-C  DEVORAH Hendrix MSN, APRN, FNP-C  [address], [address]  [place], [address] [address]  Phone: [phone]  Fax: [phone]         Oliver Chacon is a 36 y.o. adult evaluated via telephone on 5/31/2022. The patient and/or health care decision maker is aware that that he/she may receive a bill for this telephone service, depending on his/her insurance coverage, and has provided verbal consent to proceed: Yes    I affirm this is a Patient Initiated Episode with an Established Patient who has not had a related appointment within my department in the past 7 days or scheduled within the next 24 hours. The patient's identity was verified at the start of the call. Others involved in call: no ne    Total Time: minutes: 5-10 minutes    Note: not billable if this call serves to triage the patient into an appointment for the relevant concern    HPI:     We are following him for migraines    He remains an excellent historian. He is having about 2 or less migraines per month on max dose of Aimovig, which respond quickly to rizatriptan. 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 He is being more active and more social.    Medications failed: Topiramate, sumatriptan, amitriptyline.      No chest pain or palpitations  No SOB  No vertigo, lightheadedness or loss of consciousness  No falls, tripping or stumbling  No incontinence of bowels or bladder  No itching or bruising appreciated  No numbness, tingling or focal arm/leg weakness  No speech or swallowing troubles    ROS otherwise negative     Current Outpatient Medications   Medication Sig Dispense Refill    Erenumab-aooe (AIMOVIG) 140 MG/ML SOAJ Inject 140 mg into the skin every 30 days 3 pen 1    rizatriptan (MAXALT) 10 MG tablet take 1 tablet by mouth AT ONSET OF HEADACHE may repeat in 2 hours IF MIGRAINE PERSISTS maximum daily dose of 2 tablets ( 20 milligrams ) every 24 hours 15 tablet 2    cyclobenzaprine (FLEXERIL) 10 MG tablet Take 1 tablet by mouth nightly as needed      valACYclovir (VALTREX) 500 MG tablet Take 1 tablet by mouth daily      HALOBETASOL PROPIONATE 0.05 % FOAM Apply topically      DEPO-TESTOSTERONE 200 MG/ML injection Inject 0.5 mLs into the muscle once a week.   0    clindamycin (CLINDAGEL) 1 % gel as needed      buPROPion (WELLBUTRIN XL) 150 MG extended release tablet Take 150 mg by mouth every morning      Multiple Vitamins-Minerals (THERAPEUTIC MULTIVITAMIN-MINERALS) tablet Take 1 tablet by mouth daily       No current facility-administered medications for this visit. Objective     Mental status: Alert, oriented x4 and thought processes appropriate    Breathing: Sounds easy and nonlabored    Speech/language: Appropriate with no dysarthria    Labs/Radiology     No current labs or images to review    Assessment:     Episodic migraine HAs with aura: excellent control, with less than 2 headaches per month on max dose Aimovig--and Maxalt effective for rescue.      Plan:     Continue Aimovig 140 mg SQ monthly    Continue Maxalt 10 mg PRN    Call if HAs worsen    RTO in 1 year or sooner PRN    Oriana Cisneros, MESHA - CNP, [facility]  1:06 PM  5/31/2022

## 2022-05-31 NOTE — PROGRESS NOTES
Sherrie Isbell was read the following message We want to confirm that, for purposes of billing, this is a virtual visit with your provider for which we will submit a claim for reimbursement with your insurance company. You will be responsible for any copays, coinsurance amounts or other amounts not covered by your insurance company. If you do not accept this, unfortunately we will not be able to schedule or proceed with a virtual visit with the provider. Do you accept? Ed Morgan responded Yes .

## 2022-05-31 NOTE — PATIENT INSTRUCTIONS
Patient Education        Migraine Headache: Care Instructions  Overview     Migraines are painful, throbbing headaches that often start on one side of the head. They may cause nausea and vomiting and make you sensitive to light,sound, or smell. Without treatment, migraines can last from 4 hours to a few days. Medicines can help prevent migraines or stop them after they have started. Your doctor canhelp you find which ones work best for you. Follow-up care is a key part of your treatment and safety. Be sure to make and go to all appointments, and call your doctor if you are having problems. It's also a good idea to know your test results and keep alist of the medicines you take. How can you care for yourself at home?  Do not drive if you have taken a prescription pain medicine.  Rest in a quiet, dark room until your headache is gone. Close your eyes, and try to relax or go to sleep. Don't watch TV or read.  Put a cold, moist cloth or cold pack on the painful area for 10 to 20 minutes at a time. Put a thin cloth between the cold pack and your skin.  Use a warm, moist towel or a heating pad set on low to relax tight shoulder and neck muscles.  Have someone gently massage your neck and shoulders.  Take your medicines exactly as prescribed. Call your doctor if you think you are having a problem with your medicine. You will get more details on the specific medicines your doctor prescribes.  Don't take medicine for headache pain too often. Talk to your doctor if you are taking medicine more than 2 days a week to stop a headache. Taking too much pain medicine can lead to more headaches. These are called medicine-overuse headaches. To prevent migraines   Keep a headache diary so you can figure out what triggers your headaches. Avoiding triggers may help you prevent headaches. Record when each headache began, how long it lasted, and what the pain was like.  Write down any other symptoms you had with the headache, such as nausea, flashing lights or dark spots, or sensitivity to bright light or loud noise. Note if the headache occurred near your period. List anything that might have triggered the headache. Triggers may include certain foods (chocolate, cheese, wine) or odors, smoke, bright light, stress, or lack of sleep.  If your doctor has prescribed medicine for your migraines, take it as directed. You may have medicine that you take only when you get a migraine and medicine that you take all the time to help prevent migraines. ? If your doctor has prescribed medicine for when you get a headache, take it at the first sign of a migraine, unless your doctor has given you other instructions. ? If your doctor has prescribed medicine to prevent migraines, take it exactly as prescribed. Call your doctor if you think you are having a problem with your medicine.  Find healthy ways to deal with stress. Migraines are most common during or right after stressful times. Try finding ways to reduce stress like practicing mindfulness or deep breathing exercises.  Get plenty of sleep and exercise. But be careful to not push yourself too hard during exercise. It may trigger a headache.  Eat meals on a regular schedule. Avoid foods and drinks that often trigger migraines. These include chocolate, alcohol (especially red wine and port), aspartame, monosodium glutamate (MSG), and some additives found in foods (such as hot dogs, hale, cold cuts, aged cheeses, and pickled foods).  Limit caffeine. Don't drink too much coffee, tea, or soda. But don't quit caffeine suddenly. That can also give you migraines.  Do not smoke or allow others to smoke around you. If you need help quitting, talk to your doctor about stop-smoking programs and medicines. These can increase your chances of quitting for good.    If you are taking birth control pills or hormone therapy, talk to your doctor about whether they are triggering your migraines. When should you call for help? Call 911 anytime you think you may need emergency care. For example, call if:     You have signs of a stroke. These may include:  ? Sudden numbness, paralysis, or weakness in your face, arm, or leg, especially on only one side of your body. ? Sudden vision changes. ? Sudden trouble speaking. ? Sudden confusion or trouble understanding simple statements. ? Sudden problems with walking or balance. ? A sudden, severe headache that is different from past headaches. Call your doctor now or seek immediate medical care if:     You have new or worse nausea and vomiting.      You have a new or higher fever.      Your headache gets much worse. Watch closely for changes in your health, and be sure to contact your doctor if:     You are not getting better after 2 days (48 hours). Where can you learn more? Go to https://[employer]paul[employer]eweb.Zipnosis. org and sign in to your MIGSIF account. Enter E789 in the Cashkaro box to learn more about \"Migraine Headache: Care Instructions. \"     If you do not have an account, please click on the \"Sign Up Now\" link. Current as of: December 13, 2021               Content Version: 13.2  © 2853-9586 Healthwise, Incorporated. Care instructions adapted under license by [facility] ([facility]). If you have questions about a medical condition or this instruction, always ask your healthcare professional. [address]  any warranty or liability for your use of this information.

## 2022-09-14 DIAGNOSIS — G43.109 MIGRAINE WITH AURA AND WITHOUT STATUS MIGRAINOSUS, NOT INTRACTABLE: ICD-10-CM

## 2022-09-16 RX ORDER — RIZATRIPTAN BENZOATE 10 MG/1
10 TABLET ORAL DAILY PRN
Qty: 10 TABLET | Refills: 11 | Status: SHIPPED | OUTPATIENT
Start: 2022-09-16

## 2022-11-11 ENCOUNTER — TELEPHONE (OUTPATIENT)
Dept: NEUROLOGY | Age: 32
End: 2022-11-11

## 2022-11-11 NOTE — TELEPHONE ENCOUNTER
Key: [id number]  Aimovig 140MG/ML auto-injectors       [employer] Electronic PA Form (5929 NCPDP)    IDLRDQ:[id number];  Status:Approved; Review Type:Prior Auth; Coverage Start Date:11/08/2022;   Coverage End Date:11/11/2023;

## 2023-10-03 DIAGNOSIS — G43.109 MIGRAINE WITH AURA AND WITHOUT STATUS MIGRAINOSUS, NOT INTRACTABLE: ICD-10-CM

## 2023-10-05 RX ORDER — RIZATRIPTAN BENZOATE 10 MG/1
TABLET ORAL
Qty: 10 TABLET | Refills: 0 | Status: SHIPPED
Start: 2023-10-05 | End: 2023-11-04 | Stop reason: SDUPTHER

## 2023-11-04 DIAGNOSIS — G43.109 MIGRAINE WITH AURA AND WITHOUT STATUS MIGRAINOSUS, NOT INTRACTABLE: ICD-10-CM

## 2023-11-06 RX ORDER — RIZATRIPTAN BENZOATE 10 MG/1
10 TABLET ORAL DAILY PRN
Qty: 10 TABLET | Refills: 3 | Status: SHIPPED | OUTPATIENT
Start: 2023-11-06